# Patient Record
Sex: FEMALE | Race: WHITE | NOT HISPANIC OR LATINO | Employment: STUDENT | URBAN - METROPOLITAN AREA
[De-identification: names, ages, dates, MRNs, and addresses within clinical notes are randomized per-mention and may not be internally consistent; named-entity substitution may affect disease eponyms.]

---
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
## 2024-01-04 NOTE — COMMUNITY RESOURCES LIST (ENGLISH)
01/04/2024   [facility]  N/A  For questions about this resource list or additional care needs, please contact your primary care clinic or care manager.  Phone: [phone]   Email: N/A   Address: [facility] [place] [address]   Hours: N/A        Hotlines and Helplines       Hotline - Housing crisis  1  [facility] Housing Resource Line Distance: 7.42 miles      Phone/Virtual   [address] [place] [address]  Language: English  Hours: Mon - Sun Open 24 Hours   Phone: (600) 881-5868 Website: https://www.[email or web address]/Basic-Needs     2  [facility] Distance: 14.33 miles      Phone/Virtual   [address] [place] [address]  Language: English  Hours: Mon - Sun Open 24 Hours   Phone: (402) 819-9494 Email: communications@[facility].org Website: https://[facility].org/oursaviourshousing/          Housing       Coordinated Entry access point  3  [facility]  Office - [facility] Distance: 2.37 miles      Phone/Virtual   [address] [place] [address]  Language: English  Hours: Mon - Fri 8:30 AM - 12:00 PM , Mon - Fri 1:00 PM - 4:00 PM  Fees: Free   Phone: (436) 269-2165 Ext.2 Email: ashly@[facility].org Website: https://www.[facility].org/usn/     4  [facility] ([facility] - Housing Services Distance: 14.46 miles      In-Person   [address] [place] [address]  Language: English  Hours: Mon - Fri 9:00 AM - 5:00 PM  Fees: Free   Phone: (199) 672-4123 Email: housing@[facility].org Website: http://www.[facility].org/housing     Drop-in center or day shelter  5  [employer] Distance: 7.56 miles      In-Person   [address] [place] [address]  Language: English  Hours: Mon 11:00 AM - 7:00 PM , Tue 11:00 PM - 7:00 PM , Wed 1:00 AM - 7:00 PM , Thu 11:00 AM - 7:00 PM , Fri 11:00 AM - 5:00 PM  Fees: Free   Phone: (191) 756-7764 Email: info@[facility].org Website: http://[address].org     6   [facility] [employer] [facility] Distance: 12.86 miles      In-Person   [address] [place] [address]  Language: English, Hmong, Micronesian, Malay  Hours: Mon - Thu 8:30 AM - 4:30 PM , Sat - Sun 9:00 AM - 12:00 PM  Fees: Free   Phone: (304) 328-6395 Email: info@[employer].org Website: https://[employer].org/     Housing search assistance  7  [facility] [employer] [facility] ([facility]) - [facility] Rental Housing Distance: 2.36 miles      In-Person, Phone/Virtual   [address] [address]  Language: English  Hours: Mon - Fri 8:00 AM - 4:30 PM  Fees: Free   Phone: (728) 578-9278 Email: accap@[employer].org Website: http://www.[employer]ap.org     8  Neighborhood Assistance [employer] ([place]) Distance: 7.54 miles      Phone/Virtual   [address] [place] [address]  Language: English, Malay  Hours: Mon - Fri 9:00 AM - 5:00 PM  Fees: Free   Phone: (862) 101-2726 Email: services@[employer] Website: https://www.real trends     Shelter for families  9  [facility] Distance: 12.07 miles      In-Person   [address] [place] [address]  Language: English  Hours: Mon - Fri 3:00 PM - 9:00 AM , Sat - Sun Open 24 Hours  Fees: Free   Phone: (262) 316-2868 Ext.1 Website: https://www.[facility].org/2020/07/03/emergency-family-shelter/     Shelter for individuals  10  [facility] Distance: 13.19 miles      In-Person   [address] [place] [address]  Language: English  Hours: Mon - Fri 4:00 PM - 9:00 AM  Fees: Free   Phone: (969) 329-8357 Email: albert@[facility].org Website: https://[facility].[employer].org/[facility]/     11  [facility] Distance: 14.33 miles      In-Person   [address] [place] [address]  Language: English  Hours: Mon - Sun Open 24 Hours  Fees: Free   Phone: (857) 762-5547 Email: communications@[facility].org Website:  https://[facility].org/oursaviourshousing/     Shelter for youth  12  [facility] - [facility] Distance: 8.25 miles      In-Person   [address] [place] [address]  Language: English  Hours: Mon - Sun Open 24 Hours  Fees: Free   Phone: (116) 364-7839 Ext.2 Email: info@[facility].org Website: http://[facility].org/shelter-transitional-living-programs/     13  [facility] - Emergency Youth Shelter Distance: 13.63 miles      In-Person   [address] [place] [address]  Language: English  Hours: Mon - Sun Open 24 Hours  Fees: Free   Phone: (855) 190-8319 Email: kaiden@[facility].org Website: https://[facility].org/[facility]/          Important Numbers & Websites       Emergency Services   [address] [address]  Poison Control   (283) 163-1084  Suicide Prevention Lifeline   (429) 722-4638 (TALK)  Child Abuse Hotline   (814) 660-3438 (4-A-Child)  Sexual Assault Hotline   (952) 943-4226 (HOPE)  National Runaway Safeline   (578) 877-3369 (RUNAWAY)  All-Options Talkline   (764) 580-7108  Substance Abuse Referral   (287) 211-4003 (HELP)

## 2024-01-10 ENCOUNTER — VIRTUAL VISIT (OUTPATIENT)
Dept: FAMILY MEDICINE | Facility: CLINIC | Age: 19
End: 2024-01-10
Payer: COMMERCIAL

## 2024-01-10 DIAGNOSIS — F42.9 OBSESSIVE-COMPULSIVE DISORDER, UNSPECIFIED TYPE: Primary | ICD-10-CM

## 2024-01-10 PROCEDURE — 99213 OFFICE O/P EST LOW 20 MIN: CPT | Mod: 95 | Performed by: FAMILY MEDICINE

## 2024-01-10 RX ORDER — SERTRALINE HYDROCHLORIDE 25 MG/1
25 TABLET, FILM COATED ORAL DAILY
Qty: 90 TABLET | Refills: 1 | Status: CANCELLED | OUTPATIENT
Start: 2024-01-10

## 2024-01-10 RX ORDER — SERTRALINE HYDROCHLORIDE 100 MG/1
100 TABLET, FILM COATED ORAL DAILY
Qty: 90 TABLET | Refills: 1 | Status: SHIPPED | OUTPATIENT
Start: 2024-01-10 | End: 2024-06-11

## 2024-01-10 ASSESSMENT — ANXIETY QUESTIONNAIRES
GAD7 TOTAL SCORE: 5
5. BEING SO RESTLESS THAT IT IS HARD TO SIT STILL: NOT AT ALL
2. NOT BEING ABLE TO STOP OR CONTROL WORRYING: SEVERAL DAYS
1. FEELING NERVOUS, ANXIOUS, OR ON EDGE: MORE THAN HALF THE DAYS
GAD7 TOTAL SCORE: 5
6. BECOMING EASILY ANNOYED OR IRRITABLE: NOT AT ALL
IF YOU CHECKED OFF ANY PROBLEMS ON THIS QUESTIONNAIRE, HOW DIFFICULT HAVE THESE PROBLEMS MADE IT FOR YOU TO DO YOUR WORK, TAKE CARE OF THINGS AT HOME, OR GET ALONG WITH OTHER PEOPLE: SOMEWHAT DIFFICULT
3. WORRYING TOO MUCH ABOUT DIFFERENT THINGS: SEVERAL DAYS
7. FEELING AFRAID AS IF SOMETHING AWFUL MIGHT HAPPEN: SEVERAL DAYS

## 2024-01-10 ASSESSMENT — PATIENT HEALTH QUESTIONNAIRE - PHQ9
SUM OF ALL RESPONSES TO PHQ QUESTIONS 1-9: 4
5. POOR APPETITE OR OVEREATING: NOT AT ALL

## 2024-01-10 NOTE — PROGRESS NOTES
Chio is a 18 year old who is being evaluated via a billable video visit.      How would you like to obtain your AVS? MyChart  If the video visit is dropped, the invitation should be resent by: Text to cell phone: [phone]  Will anyone else be joining your video visit? No          Assessment & Plan     Chio was seen today for recheck.    Diagnoses and all orders for this visit:    Obsessive-compulsive disorder, unspecified type, slight worsening  - PHQ-9/GERONIMO 7 completed, see below/Epic for details    - Increase dose: sertraline (ZOLOFT) 100 MG tablet; Take 1 tablet (100 mg) by mouth daily  - Continue Psychotherapy Counseling.     Return in about 1 month (around 2/10/2024) for Medication check, Virtual Visit..      Audelia Squires MD  [facility] DAIN Barroso is a 18 year old, presenting for the following health issues:  RECHECK        1/10/2024    10:31 AM   Additional Questions   Roomed by MP         1/10/2024    10:31 AM   Patient Reported Additional Medications   Patient reports taking the following new medications None per patient       Medication Followup of Zoloft-OCD  Taking Medication as prescribed: yes  Side Effects:  None  Medication Helping Symptoms:  yes    Completed 1st Semester of College.    States that her mental health has generally been stable but the OCD tendencies have intensified.over the past few months   Feeling the need to touch surfaces, multiple times.  Feels like these tendencies are getting stronger and getting in the way and starting to bother her.   Still taking her Sertraline 75 mg/day as prescribed and seeing a Therapist. Tolerating meds well. No side effects reported.  Recently seen for headaches on 12/28- thought to have Episodic tension headaches, given Naproxen which is helping.   Denies any current stressors. No neurological focal deficits. Patient is aware to schedule a follow up visit if the headaches are worsening/failing to improve.          1/10/2024     4:43 PM   Last PHQ-9   1.  Little interest or pleasure in doing things 1   2.  Feeling down, depressed, or hopeless 1   3.  Trouble falling or staying asleep, or sleeping too much 0   4.  Feeling tired or having little energy 1   5.  Poor appetite or overeating 1   6.  Feeling bad about yourself 0   7.  Trouble concentrating 0   8.  Moving slowly or restless 0   Q9: Thoughts of better off dead/self-harm past 2 weeks 0   PHQ-9 Total Score 4   Difficulty at work, home, or with people Somewhat difficult         1/10/2024     4:43 PM   GERONIMO-7    1. Feeling nervous, anxious, or on edge 2   2. Not being able to stop or control worrying 1   3. Worrying too much about different things 1   4. Trouble relaxing 0   5. Being so restless that it is hard to sit still 0   6. Becoming easily annoyed or irritable 0   7. Feeling afraid, as if something awful might happen 1   GERONIMO-7 Total Score 5   If you checked any problems, how difficult have they made it for you to do your work, take care of things at home, or get along with other people? Somewhat difficult     In the past two weeks have you had thoughts of suicide or self-harm?  No.    Do you have concerns about your personal safety or the safety of others?   No        Review of Systems   Constitutional, HEENT, cardiovascular, pulmonary, gi and gu systems are negative, except as otherwise noted.      Objective           Vitals:  No vitals were obtained today due to virtual visit.    Physical Exam   GENERAL: Healthy, alert and no distress  EYES: Eyes grossly normal to inspection.  No discharge or erythema, or obvious scleral/conjunctival abnormalities.  RESP: No audible wheeze, cough, or visible cyanosis.  No visible retractions or increased work of breathing.    SKIN: Visible skin clear. No significant rash, abnormal pigmentation or lesions.  NEURO: Cranial nerves grossly intact.  Mentation and speech appropriate for age.  PSYCH: mentation appears normal, anxious, and  judgement and insight intact        Video-Visit Details    Type of service:  Video Visit     Originating Location (pt. Location): Home  Distant Location (provider location):  On-site  Platform used for Video Visit: [employer]

## 2024-01-11 ENCOUNTER — TELEPHONE (OUTPATIENT)
Dept: FAMILY MEDICINE | Facility: CLINIC | Age: 19
End: 2024-01-11
Payer: COMMERCIAL

## 2024-01-11 DIAGNOSIS — F42.9 OBSESSIVE-COMPULSIVE DISORDER, UNSPECIFIED TYPE: ICD-10-CM

## 2024-01-11 RX ORDER — SERTRALINE HYDROCHLORIDE 100 MG/1
100 TABLET, FILM COATED ORAL DAILY
Qty: 90 TABLET | Refills: 1 | OUTPATIENT
Start: 2024-01-11

## 2024-01-11 NOTE — TELEPHONE ENCOUNTER
Called patient, and she would like her meds sent to [facility] and not [facility].    Advised patient that she does have refills there already.    Patient stated understanding and agreeable with the plan of care.     Madina BOONE RN  Triage Nurse  [facility]

## 2024-01-11 NOTE — TELEPHONE ENCOUNTER
Patient has refills at local [facility] pharmacy.     Please call for clarification.     Faby Rivera RN BSN  [facility]

## 2024-01-24 DIAGNOSIS — G44.219 EPISODIC TENSION-TYPE HEADACHE, NOT INTRACTABLE: ICD-10-CM

## 2024-01-25 RX ORDER — NAPROXEN 500 MG/1
500 TABLET ORAL 2 TIMES DAILY WITH MEALS
Qty: 60 TABLET | Refills: 0 | Status: SHIPPED | OUTPATIENT
Start: 2024-01-25 | End: 2024-02-23

## 2024-02-23 DIAGNOSIS — G44.219 EPISODIC TENSION-TYPE HEADACHE, NOT INTRACTABLE: ICD-10-CM

## 2024-02-23 RX ORDER — NAPROXEN 500 MG/1
500 TABLET ORAL 2 TIMES DAILY WITH MEALS
Qty: 60 TABLET | Refills: 0 | Status: SHIPPED | OUTPATIENT
Start: 2024-02-23

## 2024-03-13 ENCOUNTER — OFFICE VISIT (OUTPATIENT)
Dept: FAMILY MEDICINE | Facility: CLINIC | Age: 19
End: 2024-03-13
Payer: COMMERCIAL

## 2024-03-13 VITALS
RESPIRATION RATE: 16 BRPM | SYSTOLIC BLOOD PRESSURE: 100 MMHG | BODY MASS INDEX: 19.66 KG/M2 | DIASTOLIC BLOOD PRESSURE: 56 MMHG | OXYGEN SATURATION: 100 % | HEART RATE: 78 BPM | HEIGHT: 65 IN | WEIGHT: 118 LBS | TEMPERATURE: 97.8 F

## 2024-03-13 DIAGNOSIS — Z11.59 NEED FOR HEPATITIS C SCREENING TEST: ICD-10-CM

## 2024-03-13 DIAGNOSIS — Z00.00 ROUTINE GENERAL MEDICAL EXAMINATION AT A HEALTH CARE FACILITY: Primary | ICD-10-CM

## 2024-03-13 DIAGNOSIS — F42.9 OBSESSIVE-COMPULSIVE DISORDER, UNSPECIFIED TYPE: ICD-10-CM

## 2024-03-13 DIAGNOSIS — Z11.4 SCREENING FOR HIV (HUMAN IMMUNODEFICIENCY VIRUS): ICD-10-CM

## 2024-03-13 DIAGNOSIS — R74.8 ELEVATED LIVER ENZYMES: ICD-10-CM

## 2024-03-13 PROCEDURE — 86803 HEPATITIS C AB TEST: CPT | Performed by: FAMILY MEDICINE

## 2024-03-13 PROCEDURE — 84450 TRANSFERASE (AST) (SGOT): CPT | Performed by: FAMILY MEDICINE

## 2024-03-13 PROCEDURE — 36415 COLL VENOUS BLD VENIPUNCTURE: CPT | Performed by: FAMILY MEDICINE

## 2024-03-13 PROCEDURE — 99395 PREV VISIT EST AGE 18-39: CPT | Performed by: FAMILY MEDICINE

## 2024-03-13 PROCEDURE — 84460 ALANINE AMINO (ALT) (SGPT): CPT | Performed by: FAMILY MEDICINE

## 2024-03-13 PROCEDURE — 99213 OFFICE O/P EST LOW 20 MIN: CPT | Mod: 25 | Performed by: FAMILY MEDICINE

## 2024-03-13 PROCEDURE — 87389 HIV-1 AG W/HIV-1&-2 AB AG IA: CPT | Performed by: FAMILY MEDICINE

## 2024-03-13 SDOH — HEALTH STABILITY: PHYSICAL HEALTH: ON AVERAGE, HOW MANY MINUTES DO YOU ENGAGE IN EXERCISE AT THIS LEVEL?: 40 MIN

## 2024-03-13 SDOH — HEALTH STABILITY: PHYSICAL HEALTH: ON AVERAGE, HOW MANY DAYS PER WEEK DO YOU ENGAGE IN MODERATE TO STRENUOUS EXERCISE (LIKE A BRISK WALK)?: 3 DAYS

## 2024-03-13 ASSESSMENT — SOCIAL DETERMINANTS OF HEALTH (SDOH): HOW OFTEN DO YOU GET TOGETHER WITH FRIENDS OR RELATIVES?: TWICE A WEEK

## 2024-03-13 ASSESSMENT — ANXIETY QUESTIONNAIRES
1. FEELING NERVOUS, ANXIOUS, OR ON EDGE: MORE THAN HALF THE DAYS
5. BEING SO RESTLESS THAT IT IS HARD TO SIT STILL: NOT AT ALL
GAD7 TOTAL SCORE: 6
3. WORRYING TOO MUCH ABOUT DIFFERENT THINGS: SEVERAL DAYS
IF YOU CHECKED OFF ANY PROBLEMS ON THIS QUESTIONNAIRE, HOW DIFFICULT HAVE THESE PROBLEMS MADE IT FOR YOU TO DO YOUR WORK, TAKE CARE OF THINGS AT HOME, OR GET ALONG WITH OTHER PEOPLE: SOMEWHAT DIFFICULT
2. NOT BEING ABLE TO STOP OR CONTROL WORRYING: SEVERAL DAYS
6. BECOMING EASILY ANNOYED OR IRRITABLE: NOT AT ALL
7. FEELING AFRAID AS IF SOMETHING AWFUL MIGHT HAPPEN: SEVERAL DAYS
GAD7 TOTAL SCORE: 6

## 2024-03-13 ASSESSMENT — PATIENT HEALTH QUESTIONNAIRE - PHQ9
SUM OF ALL RESPONSES TO PHQ QUESTIONS 1-9: 5
5. POOR APPETITE OR OVEREATING: SEVERAL DAYS

## 2024-03-13 NOTE — PROGRESS NOTES
Preventive Care Visit  [facility] DAIN Squires MD, Family Medicine  Mar 13, 2024      Assessment & Plan     Routine general medical examination at a health care facility  - REVIEW OF HEALTH MAINTENANCE PROTOCOL ORDERS    Screening for HIV (human immunodeficiency virus)  - HIV Antigen Antibody Combo    Need for hepatitis C screening test  - Hepatitis C Screen Reflex to HCV RNA Quant and Genotype    Elevated liver enzymes  - ALT  - AST    Obsessive-compulsive disorder, unspecified type  -On an SSRI  - Adult Mental Health  Referral-medication management      Patient has been advised of split billing requirements and indicates understanding: Yes        Counseling  Appropriate preventive services were discussed with this patient, including applicable screening as appropriate for fall prevention, nutrition, physical activity, Tobacco-use cessation, weight loss and cognition.  Checklist reviewing preventive services available has been given to the patient.  Reviewed patient's diet, addressing concerns and/or questions.   She is at risk for lack of exercise and has been provided with information to increase physical activity for the benefit of her well-being.   The patient's PHQ-9 score is consistent with mild depression. She was provided with information regarding depression.       Return in about 6 months (around 9/13/2024) for Medication check, Virtual Visit..      Christ Barroso is a 18 year old, presenting for the following:  Physical        3/13/2024     2:46 PM   Additional Questions   Roomed by MP         3/13/2024     2:46 PM   Patient Reported Additional Medications   Patient reports taking the following new medications None per patient            Healthy Habits:     Getting at least 3 servings of Calcium per day:  Yes    Bi-annual eye exam:  Yes    Dental care twice a year:  Yes    Sleep apnea or symptoms of sleep apnea:  None    Diet:  Regular (no restrictions)     Frequency of exercise:  2-3 days/week    Duration of exercise:  45-60 minutes    Taking medications regularly:  Yes    Barriers to taking medications:  None    Medication side effects:  None    Additional concerns today:  No    Last OV- 1/10/2024-  Reported worsening OCD symptoms.   Sertraline dose was increased to 100 mg/day. Tolerating well. No side effects reported. Sees a Therapist at school as needed.   Learning to cope with OCD symptoms, still not very well controlled.   Patient is open to psychiatry consult for medication management.          3/13/2024     3:12 PM   Last PHQ-9   1.  Little interest or pleasure in doing things 1   2.  Feeling down, depressed, or hopeless 1   3.  Trouble falling or staying asleep, or sleeping too much 0   4.  Feeling tired or having little energy 1   5.  Poor appetite or overeating 0   6.  Feeling bad about yourself 1   7.  Trouble concentrating 1   8.  Moving slowly or restless 0   Q9: Thoughts of better off dead/self-harm past 2 weeks 0   PHQ-9 Total Score 5   Difficulty at work, home, or with people Not difficult at all         3/13/2024     3:12 PM   GERONIMO-7    1. Feeling nervous, anxious, or on edge 2   2. Not being able to stop or control worrying 1   3. Worrying too much about different things 1   4. Trouble relaxing 1   5. Being so restless that it is hard to sit still 0   6. Becoming easily annoyed or irritable 0   7. Feeling afraid, as if something awful might happen 1   GERONIMO-7 Total Score 6   If you checked any problems, how difficult have they made it for you to do your work, take care of things at home, or get along with other people? Somewhat difficult     In the past two weeks have you had thoughts of suicide or self-harm?  No.    Do you have concerns about your personal safety or the safety of others?   No    Previous labs reviewed elevated liver function tests:-    Component      Latest Ref Rn 12/28/2023  4:37 PM   AST      0 - 35 U/L 51 (H)    ALT      0 - 50  U/L 65 (H)      States that she was taking a lot of Tylenol at that time for her headaches. Headaches have since resolved.   Due for repeat labs.     HEALTH CARE MAINTENANCE: Due for labs.  Declines vaccine          3/13/2024   General Health   How would you rate your overall physical health? Good   Feel stress (tense, anxious, or unable to sleep) To some extent   (!) STRESS CONCERN      3/13/2024   Nutrition   Three or more servings of calcium each day? Yes   Diet: Regular (no restrictions)   How many servings of fruit and vegetables per day? (!) 2-3   How many sweetened beverages each day? 0-1         3/13/2024   Exercise   Days per week of moderate/strenous exercise 3 days   Average minutes spent exercising at this level 40 min         3/13/2024   Social Factors   Frequency of gathering with friends or relatives Twice a week   Worry food won't last until get money to buy more No   Food not last or not have enough money for food? No   Do you have housing?  Yes   Are you worried about losing your housing? No   Lack of transportation? No   Unable to get utilities (heat,electricity)? No         3/13/2024   Dental   Dentist two times every year? Yes         3/13/2024   TB Screening   Were you born outside of [place]?  No       Today's PHQ-2 Score:       1/10/2024    10:34 AM   PHQ-2 ( 1999 [employer])   Q1: Little interest or pleasure in doing things 1   Q2: Feeling down, depressed or hopeless 1   PHQ-2 Score 2         3/13/2024   Substance Use   Alcohol more than 3/day or more than 7/wk Not Applicable   Do you use any other substances recreationally? No     Social History     Tobacco Use    Smoking status: Never     Passive exposure: Never    Smokeless tobacco: Never    Tobacco comments:     Lives in smoke free household   Vaping Use    Vaping Use: Never used   Substance Use Topics    Alcohol use: No     Alcohol/week: 0.0 standard drinks of alcohol    Drug use: No           3/13/2024   One time HIV Screening   Previous HIV  "test? No         3/13/2024   STI Screening   New sexual partner(s) since last STI/HIV test? No     History of abnormal Pap smear: NO - under age 21, PAP not appropriate for age             3/13/2024   Contraception/Family Planning   Questions about contraception or family planning No       Reviewed and updated as needed this visit by Provider                    Past Medical History:   Diagnosis Date    Amblyopia of right eye 01/24/2013     Past Surgical History:   Procedure Laterality Date    NO HISTORY OF SURGERY           Review of Systems  Constitutional, neuro, ENT, endocrine, pulmonary, cardiac, gastrointestinal, genitourinary, musculoskeletal, integument and psychiatric systems are negative, except as otherwise noted.     Objective    Exam  /56   Pulse 78   Temp 97.8  F (36.6  C) (Temporal)   Resp 16   Ht 1.651 m (5' 5\")   Wt 53.5 kg (118 lb)   LMP 03/06/2024 (Approximate)   SpO2 100%   BMI 19.64 kg/m     Estimated body mass index is 19.64 kg/m  as calculated from the following:    Height as of this encounter: 1.651 m (5' 5\").    Weight as of this encounter: 53.5 kg (118 lb).    Physical Exam  GENERAL: alert and no distress  EYES: Eyes grossly normal to inspection, PERRL and conjunctivae and sclerae normal  HENT: ear canals and TM's normal, nose and mouth without ulcers or lesions  NECK: no adenopathy, no asymmetry, masses, or scars  RESP: lungs clear to auscultation - no rales, rhonchi or wheezes  CV: regular rate and rhythm, normal S1 S2, no S3 or S4, no murmur, click or rub, no peripheral edema  ABDOMEN: soft, nontender, no hepatosplenomegaly, no masses and bowel sounds normal  MS: no gross musculoskeletal defects noted, no edema  SKIN: no suspicious lesions or rashes  NEURO: Normal strength and tone, mentation intact and speech normal  PSYCH: mentation appears normal, affect normal/bright  : Deferred.       Signed Electronically by: Audelia Squires MD    "

## 2024-03-14 LAB
ALT SERPL W P-5'-P-CCNC: 18 U/L (ref 0–50)
AST SERPL W P-5'-P-CCNC: 20 U/L (ref 0–35)
HIV 1+2 AB+HIV1 P24 AG SERPL QL IA: NONREACTIVE

## 2024-03-15 LAB — HCV AB SERPL QL IA: NONREACTIVE

## 2024-06-03 DIAGNOSIS — F42.9 OBSESSIVE-COMPULSIVE DISORDER, UNSPECIFIED TYPE: ICD-10-CM

## 2024-06-03 RX ORDER — SERTRALINE HYDROCHLORIDE 100 MG/1
100 TABLET, FILM COATED ORAL DAILY
Qty: 90 TABLET | Refills: 1 | OUTPATIENT
Start: 2024-06-03

## 2024-06-11 ENCOUNTER — VIRTUAL VISIT (OUTPATIENT)
Dept: PSYCHIATRY | Facility: CLINIC | Age: 19
End: 2024-06-11
Payer: COMMERCIAL

## 2024-06-11 ENCOUNTER — VIRTUAL VISIT (OUTPATIENT)
Dept: BEHAVIORAL HEALTH | Facility: CLINIC | Age: 19
End: 2024-06-11
Payer: COMMERCIAL

## 2024-06-11 DIAGNOSIS — F42.9 OBSESSIVE-COMPULSIVE DISORDER, UNSPECIFIED TYPE: Primary | ICD-10-CM

## 2024-06-11 DIAGNOSIS — F42.9 OBSESSIVE-COMPULSIVE DISORDER, UNSPECIFIED TYPE: ICD-10-CM

## 2024-06-11 PROCEDURE — 90791 PSYCH DIAGNOSTIC EVALUATION: CPT | Mod: 95 | Performed by: SOCIAL WORKER

## 2024-06-11 PROCEDURE — 99417 PROLNG OP E/M EACH 15 MIN: CPT | Mod: 95

## 2024-06-11 PROCEDURE — 99205 OFFICE O/P NEW HI 60 MIN: CPT | Mod: 95

## 2024-06-11 RX ORDER — SERTRALINE HYDROCHLORIDE 100 MG/1
150 TABLET, FILM COATED ORAL DAILY
Qty: 45 TABLET | Refills: 1 | Status: SHIPPED | OUTPATIENT
Start: 2024-06-11 | End: 2024-07-30

## 2024-06-11 ASSESSMENT — PATIENT HEALTH QUESTIONNAIRE - PHQ9
SUM OF ALL RESPONSES TO PHQ QUESTIONS 1-9: 7
10. IF YOU CHECKED OFF ANY PROBLEMS, HOW DIFFICULT HAVE THESE PROBLEMS MADE IT FOR YOU TO DO YOUR WORK, TAKE CARE OF THINGS AT HOME, OR GET ALONG WITH OTHER PEOPLE: SOMEWHAT DIFFICULT
SUM OF ALL RESPONSES TO PHQ QUESTIONS 1-9: 7
10. IF YOU CHECKED OFF ANY PROBLEMS, HOW DIFFICULT HAVE THESE PROBLEMS MADE IT FOR YOU TO DO YOUR WORK, TAKE CARE OF THINGS AT HOME, OR GET ALONG WITH OTHER PEOPLE: SOMEWHAT DIFFICULT
SUM OF ALL RESPONSES TO PHQ QUESTIONS 1-9: 7
SUM OF ALL RESPONSES TO PHQ QUESTIONS 1-9: 7

## 2024-06-11 ASSESSMENT — COLUMBIA-SUICIDE SEVERITY RATING SCALE - C-SSRS
TOTAL  NUMBER OF ABORTED OR SELF INTERRUPTED ATTEMPTS LIFETIME: NO
ATTEMPT LIFETIME: NO
1. HAVE YOU WISHED YOU WERE DEAD OR WISHED YOU COULD GO TO SLEEP AND NOT WAKE UP?: NO
TOTAL  NUMBER OF INTERRUPTED ATTEMPTS LIFETIME: NO
2. HAVE YOU ACTUALLY HAD ANY THOUGHTS OF KILLING YOURSELF?: NO
6. HAVE YOU EVER DONE ANYTHING, STARTED TO DO ANYTHING, OR PREPARED TO DO ANYTHING TO END YOUR LIFE?: NO

## 2024-06-11 ASSESSMENT — ANXIETY QUESTIONNAIRES
2. NOT BEING ABLE TO STOP OR CONTROL WORRYING: SEVERAL DAYS
8. IF YOU CHECKED OFF ANY PROBLEMS, HOW DIFFICULT HAVE THESE MADE IT FOR YOU TO DO YOUR WORK, TAKE CARE OF THINGS AT HOME, OR GET ALONG WITH OTHER PEOPLE?: SOMEWHAT DIFFICULT
2. NOT BEING ABLE TO STOP OR CONTROL WORRYING: SEVERAL DAYS
1. FEELING NERVOUS, ANXIOUS, OR ON EDGE: MORE THAN HALF THE DAYS
5. BEING SO RESTLESS THAT IT IS HARD TO SIT STILL: NOT AT ALL
6. BECOMING EASILY ANNOYED OR IRRITABLE: SEVERAL DAYS
6. BECOMING EASILY ANNOYED OR IRRITABLE: SEVERAL DAYS
GAD7 TOTAL SCORE: 7
7. FEELING AFRAID AS IF SOMETHING AWFUL MIGHT HAPPEN: SEVERAL DAYS
GAD7 TOTAL SCORE: 7
4. TROUBLE RELAXING: SEVERAL DAYS
IF YOU CHECKED OFF ANY PROBLEMS ON THIS QUESTIONNAIRE, HOW DIFFICULT HAVE THESE PROBLEMS MADE IT FOR YOU TO DO YOUR WORK, TAKE CARE OF THINGS AT HOME, OR GET ALONG WITH OTHER PEOPLE: SOMEWHAT DIFFICULT
1. FEELING NERVOUS, ANXIOUS, OR ON EDGE: MORE THAN HALF THE DAYS
GAD7 TOTAL SCORE: 7
5. BEING SO RESTLESS THAT IT IS HARD TO SIT STILL: NOT AT ALL
7. FEELING AFRAID AS IF SOMETHING AWFUL MIGHT HAPPEN: SEVERAL DAYS
3. WORRYING TOO MUCH ABOUT DIFFERENT THINGS: SEVERAL DAYS
7. FEELING AFRAID AS IF SOMETHING AWFUL MIGHT HAPPEN: SEVERAL DAYS
4. TROUBLE RELAXING: SEVERAL DAYS
3. WORRYING TOO MUCH ABOUT DIFFERENT THINGS: SEVERAL DAYS
GAD7 TOTAL SCORE: 7
7. FEELING AFRAID AS IF SOMETHING AWFUL MIGHT HAPPEN: SEVERAL DAYS
IF YOU CHECKED OFF ANY PROBLEMS ON THIS QUESTIONNAIRE, HOW DIFFICULT HAVE THESE PROBLEMS MADE IT FOR YOU TO DO YOUR WORK, TAKE CARE OF THINGS AT HOME, OR GET ALONG WITH OTHER PEOPLE: SOMEWHAT DIFFICULT
8. IF YOU CHECKED OFF ANY PROBLEMS, HOW DIFFICULT HAVE THESE MADE IT FOR YOU TO DO YOUR WORK, TAKE CARE OF THINGS AT HOME, OR GET ALONG WITH OTHER PEOPLE?: SOMEWHAT DIFFICULT

## 2024-06-11 NOTE — PROGRESS NOTES
"  [facility] Formerly [facility]iatry [facility] ([facility])  NEW PATIENT DIAGNOSTIC ASSESSMENT     Chio Kingston is a 18 year old who prefers the name Chio and pronouns she/her.     Initial consultation on 06/11/24.   Who referred you to care?: primary care provider  CARE TEAM:   PCP- Audelia Squires  Therapist- None              Chief Complaint   Chio identified the reason for seeking services at this time as: \"OCD and current medications\". Reported this as beginning approximately 09/01/21.    Per 03/13/24 referral to [facility] by PCP: \"OCD management\"               Assessment & Plan     History and interview support the following diagnoses:   Obsessive Compulsive Disorder    Chio is a 18-year-old adult with history of OCD who presents for psychiatric evaluation with [facility]. Medical co-morbidities include chronic headaches.    Overall Chio is doing well. She has been able to interrupt her nighttime routine and has not engaged in this routing for over a year. We completed a YBOCS today with score of 17. She feels that sertraline has been effective for OCD symptom control at 100mg daily however continues to report lingering ruminations and continued engagement in certain compulsions including checking. She has no history of safety concerns and denies SI, NSSI, or HI today. Clinical picture does not involve substance use.    We reviewed use of SSRIs for management of OCD and discussed how dosing tends to be different (typically higher) to fully target OCD symptoms. Patient in agreement with plan to titrate sertraline to 150mg daily with plan to hold dose increase for at least 8-12 weeks to assess full therapeutic efficacy of increase.     We discussed the risks and benefits of current medication regimen, including precautions, drug interactions and/or potential side effects/adverse reactions. Reviewed adverse side effects of using " medications that affect serotonin levels in the body including potential for these medications to contribute to thoughts of death or suicidal thoughts--if that were to happen we would stop the medication right away. We discussed additional side effects including but not limited to signs/symptoms of serotonin syndrome and need to seek urgent medical care should concerns for this arise, risk of time-limited GI upset, and potential for sexual side effects. The patient verbalized understanding of the risks and consented to treatment with the capacity to do so. The patient knows to call the clinic for any problems or access emergency care if needed.    Patient is not engaged in therapy at this time. She may benefit from ERP and will continue to discuss this at follow-up visits.      PSYCHOTROPIC DRUG INTERACTIONS: **Italicized interactions are for treatment plan options not currently implemented**  ADDITIVE SEDATION: sertraline, Imitrex (rare use)  ADDITIVE BLEEDING RISK: sertraline, naproxen (rare use)    MANAGEMENT:  use lowest therapeutic doses of psychotropic medications, routine monitoring, and patient aware of risk    [place] was [place] today: not using controlled substances              Plan    1) PSYCHOTROPIC MEDICATION RECOMMENDATIONS:  - Increase sertraline to 150mg daily to target OCD symptoms    2) THERAPY: Psychotherapy is a primary recommendation. Declines referral at this time. May benefit from CBT/ERP.    3) NEXT DUE:   Labs- Routine monitoring is not indicated for current psychotropic medication regimen   ECG- Routine monitoring is not indicated for current psychotropic medication regimen   Rating Scales-   YBOCS:  06/11/24: 17    4) REFERRALS / COORDINATION: None    5) DISPOSITION:   - Pt would benefit from brief psychiatric intervention (up to ~5 visits) to adjust meds and gauge for benefit.   - Follow up with CLAYTON Reyes CNP in 4-6 weeks. Plan to transition med management back to designated  prescriber after brief care is complete.   - If not seen for 6 months, follow up and prescribing will automatically revert back to referring provider / PCP.     Treatment Risk Statement:  The patient understands the risks, benefits, adverse effects and alternatives. Agrees to treatment with the capacity to do so. No medical contraindications to treatment. Agrees to contact provider for any problems. The patient understands to call 911 or go to the nearest ED if urgent or life threatening symptoms occur. Crisis contact numbers are provided routinely in the After Visit Summary.    Suicide Risk Assessment:  Today Chio Kingston reports no suicidal or homicidal ideation. In addition, there are notable risk factors for self-harm, including age and anxiety. However, risk is mitigated by commitment to family, sobriety, absence of past attempts, ability to volunteer a safety plan, history of seeking help when needed, future oriented, no access to firearms or weapons, denies suicidal intent or plan, no family history of suicide, and denies homicidal ideation, intent, or plan. Therefore, based on all available evidence including the factors cited above, Chio Kingston does not appear to be at imminent risk for self-harm, does not meet criteria for a 72-hr hold, and therefore remains appropriate for ongoing outpatient level of care.  A thorough assessment of risk factors related to suicide and self-harm have been reviewed and are noted above. The patient convincingly denies suicidality on several occasions. Local community safety resources printed and reviewed for patient to use if needed. There was no deceit detected, and the patient presented in a manner that was believable.     Recommended that patient call 911 or go to the local ED should there be a change in any of these risk factors.    PROVIDER:  CLAYTON Reyes CNP    The [facility] psychiatry providers act as a specialty service for Primary Care Providers in the   [facility] who seek to optimize medications for unstable patients. Once medications have been optimized, [facility] providers discharge the patient back to the referring Primary Care Provider for ongoing medication management. Care team has reviewed attendance agreement with patient. Patient advised that two failed appointments within 6 months may lead to termination of current episode of care.     Patient Status:  Patient will continue to be seen for ongoing consultation and stabilization.              Pertinent Background  Diagnosed with OCD September 2021 when psychotherapy was initiated. Symptoms of OCD have been present the majority of her life.   Psych pertinent item history includes trauma hx              History of Present Illness     Patient attends today's visit alone and she is a good historian. EHR reviewed for collateral. We reviewed the following:     Taking sertraline for about 2 years (has been at 100mg for about 4 months) which has been effective (less rumination, was able to interrupt nighttime routine).     OCD  -Obsessions: Fear of safety for herself/others (generally fear of harm for loved ones)    Compulsions:   -hx of nighttime routine involving need for specific thoughts, touching things in a specific way.(has not engaged in routine for 1.5 years). Would take around 30 minutes.  -Denies current routines/rituals; better able to interrupt routines with sertraline  - Compulsions tend to occur checking, touching    Mood  -Denies symptoms of depression or low mood.  -Energy is stable   -Denies SI, NSSI, HI. No history of safety concerns.    Sleep  -Denies concerns. Getting about 7-8 hours per night.  -No nightmares. About 1-2 years ago had been experiencing more frequent nightmares.     Current Psychotropic Medication  Zoloft 100mg daily    Medication ASE: Denies   Medication adherence: Denies issues     Psychiatric Review of Symptoms Per [facility], Geovanna Blackwell, [facility], during today's  "team-based visit:  Depression: No symptoms  Mel:  No Symptoms  Psychosis: No Symptoms  Anxiety: Excessive worry, Nervousness, and Ruminations  Panic:  No symptoms  Post Traumatic Stress Disorder:  No Symptoms   Eating Disorder: No Symptoms  ADD / ADHD:  No symptoms  Conduct Disorder: No symptoms  Autism Spectrum Disorder: No symptoms  Obsessive Compulsive Disorder: Checking, Obsessions, and ruminations (focus of those varies, rituals if not done correctly have to be re-done, \"slows me down\",safety concerns for self and others (no times when she felt unsafe for self/others)    Pertinent Negatives: No suicidal or violent ideation, psychosis, or hypo/mel.      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   LIVING SITUATION / RELATIONSHIPS- Currently at home for summer.   SOCIAL/ SPIRITUAL SUPPORT- Good friend, gets along with brother well    Pertinent Substance Use  Alcohol- None  Nicotine- None  Caffeine- 1 cup of coffee per day  Opioids- None  Narcan Kit- No  THC/CBD- None  Other Illicit Drugs- none    Substance Use History  Past Use- None  Treatment [#, most recent]- None  Medical Consequences [withdrawal, sz etc]- None  Legal Consequences- None              Medical Review of Systems   Dizziness/orthostasis- Denies   Headaches- Endorses hx of chronic headaches; rare use of naproxen or Sumatriptan   Respiratory- Denies  Cardiovascular- Denies chest pain, tightness, palpitations   Gastrointestinal- Denies  Tics, tremors, restlessness- Denies  Sexual health concerns- None  A comprehensive review of systems was performed and is negative other than noted above.              Past Psychiatric History   Have you been previously diagnosed with any of these mental health condition(s)?: obsessive compulsive disorder What mental health services have you received in the past?: therapy Currently, are you receiving any of the following mental health services?: " none    Self injurious behavior [method, most recent]- None  Suicide attempt [#, most recent, method]- None  Suicidal ideation [passive, active]- None   History of violence/aggression/HI- None   What in the following list protects you from causing harm to yourself or others?: forward or future oriented thinking;dedication to family or friends;safe and stable environment;regular sleep;effectively controls impulses;regular physical activity;abstinence from substances;adherence with prescribed medication;effective problem solving skills;commitment to well being;positive social skills;healthy fear of risky behaviors or pain;sense of personal control or determination, Are there firearms in your home?: are not    Psychosis hx- Denies  Psych hosp [ #, most recent, committed]- Denies  ECT/TMS [#, most recent]- Denies    Eating disorder hx- Denies   Trauma hx-   -Close friend  via suicide (May 2021)  Outpatient programs [Day treatment, DBT, eating disorder tx, etc]- Individual therapy - some exposure therapy.               Past Psychotropic Medications    None    Are you taking/ not taking prescription medications as they were prescribed?: taking              Social History                [per patient report]  Financial- What are your current financial sources?: employment;parents, Does your finances cause stress?: does not  Employment- What is your employment status?: employed fulltime, Able to function?: yes, If you work in a paying job or as a volunteer, describe the job and how long you have held it: : Intern at [employer] for about 3 weeks so far, will continue to . Did you serve in the ?: did not  Living situation- What is your housing situation?: staying in own home/apartment  Feels safe at home- Yes  Household / family- Name: Windy Kingston, Age: 59, Relationship: Mother, Living in same house?: yes, Name: Meet Kingston, Age: 59, Relationship: Father, Living in same house?: yes, Name: Wilfrid  Vashti, Age: 22, Relationship: Brother, Living in same house?: yes  Relationships- What is your current relationship status? : single, What is your sexual orientation?: asexual  Children- Do you have children?: no  Social/spiritual support- Who are the most supportive people in your life?  : siblings;friends  Cultural- What is your cultural background? : , What are ethnic, cultural, or Sikhism influences that may be useful to know about you (for example history of experiencing discrimination, growing up rural/urban, valuing culturally specific treatments)?  : None, What is your preferred language?  : English  Education- What is your highest education? : some college  Early history- Where did you grow up?: [facility], Who took care of you as a child?: biological parents  Raised by- How would you describe your parent's relationships?: were always together  Siblings- Do you have siblings?: yes, How many full siblings do you have?: 1  Quality of family relationships- How would you describe your current family relationships?: fair  Legal- Have you been involved with the legal system (child custody, order for protection, DWI, etc.)?: have not, Do you have a ?  : does not              Family History     Denies family hx of MH/DAV              Past Medical History     Medication allergies:    Allergies   Allergen Reactions    Penicillins Rash     Neurologic Hx [head injury, seizures, etc.]: Denies seizure hx. Minor concussion around age 9 y/o.   Patient Active Problem List   Diagnosis    Amblyopia of right eye    Episodic tension-type headache, not intractable    Pain of left forearm    OCD (obsessive compulsive disorder)     Past Medical History:   Diagnosis Date    Amblyopia of right eye 01/24/2013    OCD (obsessive compulsive disorder)      Contraception- None  Sexually active- Denies              Medications   Current Outpatient Medications   Medication Sig Dispense Refill     Cholecalciferol (VITAMIN D PO) Take  by mouth daily.      naproxen (NAPROSYN) 500 MG tablet TAKE 1 TABLET(500 MG) BY MOUTH TWICE DAILY WITH MEALS 60 tablet 0    Omega-3 Fatty Acids (FISH OIL PO) Take  by mouth.      Pediatric Multivitamins-Fl (MULTI VIT/FL PO) Take  by mouth.      sertraline (ZOLOFT) 100 MG tablet Take 1 tablet (100 mg) by mouth daily 90 tablet 1    SUMAtriptan (IMITREX) 50 MG tablet Take 1 tablet (50 mg) by mouth at onset of headache for migraine May repeat in 2 hours. Max 4 tablets/24 hours. 30 tablet 0                 Physical Exam  (Vitals Only)  There were no vitals taken for this visit.    Pulse Readings from Last 5 Encounters:   03/13/24 78   12/28/23 81   07/22/22 77   06/27/22 96   03/21/22 91     Wt Readings from Last 5 Encounters:   03/13/24 53.5 kg (118 lb) (31%, Z= -0.50)*   12/28/23 54.9 kg (121 lb) (38%, Z= -0.31)*   11/18/22 54 kg (119 lb) (39%, Z= -0.28)*   07/22/22 53.1 kg (117 lb) (36%, Z= -0.36)*   06/27/22 52.3 kg (115 lb 4 oz) (33%, Z= -0.45)*     * Growth percentiles are based on [facility] (Girls, 2-20 Years) data.     BP Readings from Last 5 Encounters:   03/13/24 100/56   12/28/23 115/79   11/18/22 106/72   07/22/22 107/65 (37%, Z = -0.33 /  48%, Z = -0.05)*   06/27/22 117/80 (75%, Z = 0.67 /  94%, Z = 1.55)*     *BP percentiles are based on the 2017 AAP Clinical Practice Guideline for girls                 Mental Status Exam  Alertness: alert  and oriented  Appearance: adequately groomed  Behavior/Demeanor: cooperative, pleasant, and calm, with good eye contact   Speech: normal and regular rate and rhythm  Language: intact and no problems  Psychomotor: normal or unremarkable  Mood: description consistent with euthymia  Affect: full range and appropriate; was congruent to mood  Thought Process/Associations: unremarkable  Thought Content:  Reports none;  Denies suicidal ideation, violent ideation, and delusions  Perception:  Reports none;  Denies auditory hallucinations and visual  hallucinations  Insight: intact  Judgment: intact  Cognition: does  appear grossly intact; formal cognitive testing was not done  Gait and Station:  N/A telehealth              Data       2024     8:31 AM   PROMIS-10 Total Score w/o Sub Scores   PROMIS TOTAL - SUBSCORES 29    29         2024     8:31 AM   CAGE-AID Total Score   Total Score 0    0   Total Score MyChart 0 (A total score of 2 or greater is considered clinically significant)         1/10/2024     4:43 PM 3/13/2024     3:12 PM 2024     8:29 AM   PHQ   PHQ-9 Total Score 4 5 7    7   Q9: Thoughts of better off dead/self-harm past 2 weeks Not at all Not at all Not at all         1/10/2024     4:43 PM 3/13/2024     3:12 PM 2024     8:30 AM   GERONIMO-7 SCORE   Total Score   7 (mild anxiety)   Total Score 5 6 7    7         Liver/kidney function Metabolic Blood counts   Recent Labs   Lab Test 24  1530 23  1637   CR  --  0.70   AST 20 51*   ALT 18 65*   ALKPHOS  --  65    Recent Labs   Lab Test 23  1637   TSH 2.98    Recent Labs   Lab Test 23  1637   WBC 6.8   HGB 13.8   HCT 40.0   MCV 85           No results found for this or any previous visit.    Administrative Billin minutes spent on the date of the encounter doing chart review and reviewing referral documents, history and exam of patient, documentation and further activities as noted above.    Video/Phone Start Time: 925   Video/Phone End Time: 959

## 2024-06-11 NOTE — NURSING NOTE
Is the patient currently in the state of [place]? YES    Visit mode:VIDEO    If the visit is dropped, the patient can be reconnected by: VIDEO VISIT: Text to cell phone:   Telephone Information:   Mobile [phone]       Will anyone else be joining the visit? NO  (If patient encounters technical issues they should call [phone]    How would you like to obtain your AVS? MyChart    Are changes needed to the allergy or medication list? Pt stated no changes to allergies and Pt stated no med changes    Are refills needed on medications prescribed by this physician?     Reason for visit: Consult    Cheyenne Rome F      Care team has reviewed attendance agreement with patient. Patient advised that two failed appointments within 6 months may lead to termination of current episode of care.

## 2024-06-11 NOTE — PROGRESS NOTES
Virtual Visit Details    Type of service:  Video Visit     Originating Location (pt. Location): Home    Distant Location (provider location):  Off-site  Platform used for Video Visit: Izabella

## 2024-06-11 NOTE — PATIENT INSTRUCTIONS
**For crisis resources, please see the information at the end of this document**     Thank you for coming to the [facility] [place] [facility].    Medication Plan  - Increase sertraline to 150mg daily to target OCD symptoms    LABS/PROCEDURES:   Please call your [facility] clinic and ask for a lab only appointment at your earliest convenience.  If your results are reassuring or normal they will be mailed to you or sent through [facility] within 7 days. If the lab tests need quick action we will call you with the results.    FOLLOW UP: Schedule an appointment with me in 4-6 weeks or sooner as needed.  The intake team should be calling you to schedule.  If you dont hear from them, or they were unable to reach you, please call [phone] to schedule.  Follow up with primary care provider as planned or for acute medical concerns.  Call the psychiatric nurse line with medication questions or concerns at [phone].    Medication Refills:  If you need any refills please call your pharmacy and they will contact us. Our fax number for refills is [phone]. Please allow three business for refill processing. If you need to  your refill at a new pharmacy, please contact the new pharmacy directly. The new pharmacy will help you get your medications transferred.     [facility] Assistance [phone]  Financial Assistance [phone]  [facility] Billing [phone]  Central Billing Office, [facility]: [phone]  [facility] Billing [phone]  Medical Records [phone]  [facility] Patient Bill of Rights [email or web address]       MENTAL HEALTH CRISIS RESOURCES:  For a emergency help, please call 911 or go to the nearest Emergency Department.     Emergency Walk-In Options:   EmP[facility]AMES Unit @ [facility] ([place]): [phone] - Specialized mental health emergency area designed to be calming  [facility]  ([place]): [phone]  [facility] ([place]): [phone]  [facility] ([place]): [phone]    National Crisis Information: Call 988 Suicide and Crisis Lifeline  Crisis Text Line (free 24/7):  call **CRISIS (**798166) Crisis or use the texting option by texting 710176.   National Suicide Prevention Lifeline: Call 988  Poison Control Center: 1-502-814-9245  Trans Lifeline: 1-014-234-1637 - Hotline for transgender people of all ages  The Marty Project: 6-371-902-6927 - Hotline for LGBT youth   List of all [facility] resources:   https://[place].gov/dhs/people-we-serve/adults/health-care/mental-health/resources/crisis-contacts.jsp    For Non-Emergency Support:   Fast Tracker: Mental Health & Substance Use Disorder Resources -   https://www.fasttrackermn.org/       Again thank you for choosing [facility] [place] [facility] and please let us know how we can best partner with you to improve you and your family's health.    You may be receiving a survey regarding this appointment. We would love to have your feedback, both positive and negative. The survey is done by an external company, so your answers are anonymous.

## 2024-07-29 NOTE — PROGRESS NOTES
[facility]  2024      Behavioral Health Clinician Progress Note    Patient Name: Chio Kingston           Service Type:  Individual      Service Location:   [place] / Email (patient reached)     Session Start Time: 330pm  Session End Time: 340      Session Length: 10 min     Attendees: Patient     Service Modality:  Video Visit:      Provider verified identity through the following two step process.  Patient provided:  Patient  and Patient address    Telemedicine Visit: The patient's condition can be safely assessed and treated via synchronous audio and visual telemedicine encounter.      Reason for Telemedicine Visit: Patient convenience (e.g. access to timely appointments / distance to available provider)    Originating Site (Patient Location): Patient's home    Distant Site (Provider Location): Provider Remote Setting- Home Office    Consent:  The patient/guardian has verbally consented to: the potential risks and benefits of telemedicine (video visit) versus in person care; bill my insurance or make self-payment for services provided; and responsibility for payment of non-covered services.     Patient would like the video invitation sent by:  My Chart    Mode of Communication:  Video Conference via [facility]    Distant Location (Provider):  Off-site    As the provider I attest to compliance with applicable laws and regulations related to telemedicine.    Visit Activities (Refresh list every visit): [facility] [place] Only    Diagnostic Assessment Date: 2024  Treatment Plan Review Date: 10/30/2024  See Flowsheets for today's PHQ-9 and GERONIMO-7 results  Previous PHQ-9:       1/10/2024     4:43 PM 3/13/2024     3:12 PM 2024     8:29 AM   PHQ-9 SCORE   PHQ-9 Total Score MyChart   7 (Mild depression)   PHQ-9 Total Score 4 5 7    7     Previous GERONIMO-7:       1/10/2024     4:43 PM 3/13/2024     3:12 PM 2024     8:30 AM   GERONIMO-7 SCORE   Total Score   7 (mild  anxiety)   Total Score 5 6 7    7       SPEEDY LEVEL:       No data to display                DATA  Extended Session (60+ minutes): No  Interactive Complexity: No  Crisis: No  [facility] Patient: No    Treatment Objective(s) Addressed in This Session:  Target Behavior(s):  fewer incidents of checking    Functional Impairment: will effectively address problems that interfere with adaptive functioning    Current Stressors / Issues:  MH update: Pt reports that she hasn't noticed many changes in her OCD but her obsessin shave been improving. C explored functional impairments. She reports that she checks things excessively. She has had routines in the past but none currently. [facility] explored patients interest in therapy. She plans on going to the counseling center at her college ([employer]). [facility] positively reinforced. [facility] explored feelings about returning to college. Excited. She is in the orchestra and the physics club. [facility] positively reinforced importance of social connection.   Stresses: returning to college  Appetite: unremarkable  Sleep: unremarkable  Therapy: Pt plans on pursuing it when she returns to [facility] at her campus counseling center  DAV: No  Preg: No  Interventions: goal development  Most important: medication update       Progress on Treatment Objective(s) / Homework:  No improvement - ACTION (Actively working towards change); Intervened by reinforcing change plan / affirming steps taken    Motivational Interviewing    MI Intervention: Co-Developed Goal: fewer incidents of checking, Expressed Empathy/Understanding, Open-ended questions, Reflections: simple and complex, Change talk (evoked), and Reframe     Change Talk Expressed by the Patient: Desire to change Ability to change Reasons to change Need to change Committment to change Activation Taking steps    Provider Response to Change Talk: E - Evoked more info from patient about behavior change, A - Affirmed patient's thoughts, decisions, or  attempts at behavior change, R - Reflected patient's change talk, and S - Summarized patient's change talk statements    Also provided psychoeducation about behavioral health condition, symptoms, and treatment options    Assessments completed prior to visit:  The following assessments were completed by patient for this visit:  PROMIS 10-Global Health (all questions and answers displayed):       6/11/2024     8:31 AM 7/30/2024     3:17 PM   PROMIS 10   In general, would you say your health is: Good    In general, would you say your quality of life is: Very good    In general, how would you rate your physical health? Very good    In general, how would you rate your mental health, including your mood and your ability to think? Good    In general, how would you rate your satisfaction with your social activities and relationships? Good    In general, please rate how well you carry out your usual social activities and roles Very good    To what extent are you able to carry out your everyday physical activities such as walking, climbing stairs, carrying groceries, or moving a chair? Completely    In the past 7 days, how often have you been bothered by emotional problems such as feeling anxious, depressed, or irritable? Sometimes    In the past 7 days, how would you rate your fatigue on average? Moderate    In the past 7 days, how would you rate your pain on average, where 0 means no pain, and 10 means worst imaginable pain? 2    In general, would you say your health is: 3 4   In general, would you say your quality of life is: 4 4   In general, how would you rate your physical health? 4 4   In general, how would you rate your mental health, including your mood and your ability to think? 3 3   In general, how would you rate your satisfaction with your social activities and relationships? 3 3   In general, please rate how well you carry out your usual social activities and roles. (This includes activities at home, at work and  in your community, and responsibilities as a parent, child, spouse, employee, friend, etc.) 4 3   To what extent are you able to carry out your everyday physical activities such as walking, climbing stairs, carrying groceries, or moving a chair? 5 5   In the past 7 days, how often have you been bothered by emotional problems such as feeling anxious, depressed, or irritable? 3 4   In the past 7 days, how would you rate your fatigue on average? 3 3   In the past 7 days, how would you rate your pain on average, where 0 means no pain, and 10 means worst imaginable pain? 2 4   Global Mental Health Score 13    13 12   Global Physical Health Score 16    16 15   PROMIS TOTAL - SUBSCORES 29    29 27     PROMIS 10-Global Health (only subscores and total score):       6/11/2024     8:31 AM 7/30/2024     3:17 PM   PROMIS-10 Scores Only   Global Mental Health Score 13    13 12   Global Physical Health Score 16    16 15   PROMIS TOTAL - SUBSCORES 29    29 27       Care Plan review completed: Yes    Medication Review:  No changes to current psychiatric medication(s)    Medication Compliance:  Yes    Changes in Health Issues:   None reported    Chemical Use Review:   Substance Use: Chemical use reviewed, no active concerns identified      Tobacco Use: No current tobacco use.      Assessment: Current Emotional / Mental Status (status of significant symptoms):  Risk status (Self / Other harm or suicidal ideation)  Patient denies a history of suicidal ideation, suicide attempts, self-injurious behavior, homicidal ideation, homicidal behavior, and and other safety concerns  Patient denies current fears or concerns for personal safety.  Patient denies current or recent suicidal ideation or behaviors.  Patient denies current or recent homicidal ideation or behaviors.  Patient denies current or recent self injurious behavior or ideation.  Patient denies other safety concerns.  A safety and risk management plan has not been developed at  this time, however patient was encouraged to call [place] [phone] should there be a change in any of these risk factors.    Appearance:   Appropriate   Eye Contact:   Good   Psychomotor Behavior: Normal   Attitude:   Cooperative   Orientation:   All  Speech   Rate / Production: Normal    Volume:  Normal   Mood:    Anxious   Affect:    Appropriate   Thought Content:  Clear   Thought Form:  Coherent  Logical   Insight:    Good     Diagnoses:  1. Obsessive-compulsive disorder, unspecified type        Collateral Reports Completed:  Collaborated with [facility] team     Plan: (Homework, other):  Patient was given information about behavioral services and encouraged to schedule a follow up appointment with the clinic [facility] in 1 month.  She was also given information about mental health symptoms and treatment options .  CD Recommendations: No indications of CD issues.       Geovanna Blackwell, [facility]    ______________________________________________________________________    Integrated Primary Care Behavioral Health Treatment Plan    Individual Treatment Plan    Patient's Name: Chio Kingston   YOB: 2005  Date of Creation: 07/30/2024  Date Treatment Plan Last Reviewed/Revised: pending    DSM5 Diagnoses: 300.3 (F42) Unspecified Obsessive Compulsive and Related Disorder  Psychosocial / Contextual Factors: none noted  PROMIS (reviewed every 90 days):   The following assessments were completed by patient for this visit:  PROMIS 10-Global Health (all questions and answers displayed):       6/11/2024     8:31 AM 7/30/2024     3:17 PM   PROMIS 10   In general, would you say your health is: Good    In general, would you say your quality of life is: Very good    In general, how would you rate your physical health? Very good    In general, how would you rate your mental health, including your mood and your ability to think? Good    In general, how would you rate your satisfaction with your social activities and  relationships? Good    In general, please rate how well you carry out your usual social activities and roles Very good    To what extent are you able to carry out your everyday physical activities such as walking, climbing stairs, carrying groceries, or moving a chair? Completely    In the past 7 days, how often have you been bothered by emotional problems such as feeling anxious, depressed, or irritable? Sometimes    In the past 7 days, how would you rate your fatigue on average? Moderate    In the past 7 days, how would you rate your pain on average, where 0 means no pain, and 10 means worst imaginable pain? 2    In general, would you say your health is: 3 4   In general, would you say your quality of life is: 4 4   In general, how would you rate your physical health? 4 4   In general, how would you rate your mental health, including your mood and your ability to think? 3 3   In general, how would you rate your satisfaction with your social activities and relationships? 3 3   In general, please rate how well you carry out your usual social activities and roles. (This includes activities at home, at work and in your community, and responsibilities as a parent, child, spouse, employee, friend, etc.) 4 3   To what extent are you able to carry out your everyday physical activities such as walking, climbing stairs, carrying groceries, or moving a chair? 5 5   In the past 7 days, how often have you been bothered by emotional problems such as feeling anxious, depressed, or irritable? 3 4   In the past 7 days, how would you rate your fatigue on average? 3 3   In the past 7 days, how would you rate your pain on average, where 0 means no pain, and 10 means worst imaginable pain? 2 4   Global Mental Health Score 13    13 12   Global Physical Health Score 16    16 15   PROMIS TOTAL - SUBSCORES 29    29 27     PROMIS 10-Global Health (only subscores and total score):       6/11/2024     8:31 AM 7/30/2024     3:17 PM  "  PROMIS-10 Scores Only   Global Mental Health Score 13    13 12   Global Physical Health Score 16    16 15   PROMIS TOTAL - SUBSCORES 29    29 27        Referral / Collaboration:  Collaborated with [facility] team .    Anticipated number of session for this episode of care: 6-9 sessions  Anticipation frequency of session: Monthly  Anticipated Duration of each session: 16-37 minutes  Treatment plan will be reviewed in 90 days or when goals have been changed.       MeasurableTreatment Goal(s) related to diagnosis / functional impairment(s)  Goal 1: Patient will experience less anxiety   \"I will know I've met my goal when I check less.\"     Objective #A (Patient Action)    Patient will  be able to function without checking things repeatdly  Status: New - Date: 07/30/2024      Intervention(s)  [facility] provided psycho-education for symptom management including the following:     https://www.Proven.com/watch?v=Ui9nyOtShH9  https://www.YelloYelloube.com/watch?v=7-8Y6UbYmeH  https://[facility]/for-[facility]/mindfulness-programs/mindfulness-based-stress-reduction       Slow breathing. When you're anxious, your breathing becomes faster and shallower. Try deliberately slowing down your breathing. Count to three as you breathe in slowly - then count to three as you breathe out slowly.    Progressive muscle relaxation. Find a quiet location. Close your eyes and slowly tense and then relax each of your muscle groups from your toes to your head. Hold the tension for three seconds and then release quickly. This can help reduce the feelings of muscle tension that often comes with anxiety.    Stay in the present moment. Anxiety can make your thoughts live in a terrible future that hasn't happened yet. Try to bring yourself back to where you are. Practising meditation can help.    Healthy lifestyle. Keeping active, eating well, going out into nature, spending time with family and friends, reducing stress and doing the activities you enjoy are all " effective in reducing anxiety and improving your wellbeing.       Take small acts of bravery. Avoiding what makes you anxious provides some relief in the short term, but can make you more anxious in the long term. Try approaching something that makes you anxious - even in a small way. The way through anxiety is by learning that what you fear isn't likely to happen - and if it does, you'll be able to cope with it.    Challenge your self-talk. How you think affects how you feel. Anxiety can make you overestimate the danger in a situation and underestimate your ability to handle it. Try to think of different interpretations to a situation that's making you anxious, rather than jumping to the worst-case scenario. Look at the facts for and against your thought being true.    Plan worry time. It's hard to stop worrying entirely so set aside some time to indulge your worries. Even 10 minutes each evening to write them down or go over them in your head can help stop your worries from taking over at other times.    Get to know your anxiety. Keep a diary of when it's at it's best - and worst. Find the patterns and plan your week - or day - to proactively manage your anxiety.    Learn from others. Talking with others who also experience anxiety - or are going through something similar - can help you feel less alone. Visit our Online Forums to connect with others.    Be kind to yourself. Remember that you are not your anxiety. You are not weak. You are not inferior. You have a mental health condition. It's called anxiety.     Body based skills: Change body temperature by holding ice cube, running hands under cold water, drinking cold water. Eat an altoid mint or other strongly flavored mint candy or sour candy like Warheads or lemon drops. The sour taste will quickly distract from the anxiety and the sweet flavor at the end is rewarding to the brain.     Box Breathing:  Step 1: Breathe in, counting to four slowly. Feel the air  enter your lungs.  Step 2: Hold your breath for 4 seconds. Try to avoid inhaling or exhaling for 4 seconds.  Step 3: Slowly exhale through your mouth for 4 seconds.  Step 4: Repeat steps 1 to 3 until you feel re-centered.    5 Things exercise: find 5 things in the environment that are the same color, 4 that are a same shape, 3 that are the same size, 2 smells, 1 sound      [facility] provided psycho-education on Acceptance and Commitment skills including:  -values clarification  -behavioral activation planning including overcoming barriers  -mindfulness  -cognitive diffusion skill  -facilitate self acceptance    [facility] provided psycho-education on sleep skills including:  -sleep hygiene  -tracking sleep schedule  -creating sleep routine    [facility] provided psycho-education on DBT skills  -mindfulness  -mood regulation techniques    -5 things exercise    -somatic regulation skills (taste of something sweet or sour, notice smells, touch/feel of things in the environment, physical activity such as dancing, walking)    [facility] provided psycho-education of CBT skills including the following:  -challenging destructive thinking  -re-framing  -behavioral activation  -journaling  -mood/behavioral tracking  -exposure response  -mood regulation (deep breathing, somatic skills)    Patient has reviewed and agreed to the above plan.    Written by  Geovanna Blackwell, [facility], [facility]

## 2024-07-30 ENCOUNTER — VIRTUAL VISIT (OUTPATIENT)
Dept: BEHAVIORAL HEALTH | Facility: CLINIC | Age: 19
End: 2024-07-30
Payer: COMMERCIAL

## 2024-07-30 ENCOUNTER — VIRTUAL VISIT (OUTPATIENT)
Dept: PSYCHIATRY | Facility: CLINIC | Age: 19
End: 2024-07-30
Payer: COMMERCIAL

## 2024-07-30 DIAGNOSIS — F42.9 OBSESSIVE-COMPULSIVE DISORDER, UNSPECIFIED TYPE: ICD-10-CM

## 2024-07-30 DIAGNOSIS — Z79.899 ENCOUNTER FOR LONG-TERM (CURRENT) USE OF MEDICATIONS: Primary | ICD-10-CM

## 2024-07-30 DIAGNOSIS — F42.9 OBSESSIVE-COMPULSIVE DISORDER, UNSPECIFIED TYPE: Primary | ICD-10-CM

## 2024-07-30 PROCEDURE — 99207 PR NO CHARGE LOS: CPT | Mod: 95 | Performed by: SOCIAL WORKER

## 2024-07-30 PROCEDURE — G2211 COMPLEX E/M VISIT ADD ON: HCPCS | Mod: 95

## 2024-07-30 PROCEDURE — 99214 OFFICE O/P EST MOD 30 MIN: CPT | Mod: 95

## 2024-07-30 RX ORDER — SERTRALINE HYDROCHLORIDE 100 MG/1
150 TABLET, FILM COATED ORAL DAILY
Qty: 45 TABLET | Refills: 1 | Status: SHIPPED | OUTPATIENT
Start: 2024-07-30 | End: 2024-09-12

## 2024-07-30 ASSESSMENT — PAIN SCALES - GENERAL: PAINLEVEL: NO PAIN (0)

## 2024-07-30 NOTE — PATIENT INSTRUCTIONS
Medication Plan  -Continue sertraline 150mg daily  -Complete EKG through PCP clinic    **For crisis resources, please see the information at the end of this document**   Patient Education    Thank you for coming to the [facility] [place] [facility].     Lab Testing:  If you had lab testing today and your results are reassuring or normal they will be mailed to you or sent through [employer] within 7 days. If the lab tests need quick action we will call you with the results. The phone number we will call with results is # [phone]. If this is not the best number please call our clinic and change the number.     Medication Refills:  If you need any refills please call your pharmacy and they will contact us.  Three business days of notice are needed for general medication refill requests.   Five business days of notice are needed for controlled substance refill requests.   If you need to change to a different pharmacy, please contact the new pharmacy directly. The new pharmacy will help you get your medications transferred.     Contact Us:  Please call [phone] during business hours (8-5:00 M-F).   Financial Assistance [phone]   Medical Records [phone]       MENTAL HEALTH CRISIS RESOURCES:  For a emergency help, please call 911 or go to the nearest Emergency Department.     Emergency Walk-In Options:   EmPATH Unit @ [facility] ([place]): [phone] - Specialized mental health emergency area designed to be calming  [facility] ([place]): [phone]  [facility] ([place]): [phone]  [facility]): [phone]    County Crisis Information:   [place]: [phone]  [place]: [phone]  [place] (MARY) - Adult: [phone]     Child: [phone]  [place] - Adult: [phone]     Child: [phone]  [place]: [phone]  List of all [facility] resources:    https://[place].gov/dhs/people-we-serve/adults/health-care/mental-health/resources/crisis-contacts.jsp    National Crisis Information:   Crisis Text Line: Text  [place]  to 569208  Suicide & Crisis Lifeline: 988  National Suicide Prevention Lifeline: 4-948-649-TALK (1-347.492.5694)       For online chat options, visit https://suicidepreventionlifeline.org/chat/  Poison Control Center: 0-375-229-0109  Trans Lifeline: 2-091-410-1247 - Hotline for transgender people of all ages  The [employer] Project: 8-086-984-7789 - Hotline for LGBT youth     For Non-Emergency Support:   Fast Tracker: Mental Health & Substance Use Disorder Resources -   https://www.[employer].org/

## 2024-07-30 NOTE — NURSING NOTE
Is the patient currently in the state of [place]? YES    Current patient location: [address] [place] [id number]    Visit mode:VIDEO    If the visit is dropped, the patient can be reconnected by: VIDEO VISIT:  Send e-mail to at [email or web address]    Will anyone else be joining the visit? No  (If patient encounters technical issues they should call [phone])    How would you like to obtain your AVS? MyChart    Are changes needed to the allergy or medication list? No    Are refills needed on medications prescribed by this physician? NO    Rooming Documentation: Assigned questionnaire(s) completed .    Reason for visit: RAKAN Tucker

## 2024-07-30 NOTE — Clinical Note
Jeffrey Squires-  I've placed an order for Chio to complete an EKG - I would like to establish a baseline prior to using higher doses of SSRIs for OCD treatment. Is she able to complete this through your office and if so, should she call to schedule a visit with you or nursing-only visit?  Thanks in advance, Marcie

## 2024-07-30 NOTE — PROGRESS NOTES
[facility] [place]  Collaborative Care Psychiatry Service ([facility])  MEDICAL PROGRESS NOTE     Chio Kingston is a 18 year old who prefers the name Chio and pronouns she/her.     Initial consultation on 06/11/24.      CARE TEAM:   PCP- Audelia Squires  Therapist- None              Assessment & Plan     History and interview support the following diagnoses:   Obsessive Compulsive Disorder    Chio is a 18-year-old adult with history of OCD who presents for psychiatric follow-up with [facility]. Medical co-morbidities include chronic headaches.    Patient last seen 06/11/24 at which time sertraline was increased to 150mg daily. Today, Chio notes minimal benefit from Zoloft increase. She has now been at 150mg daily for ~7 weeks. She is tolerating the medication without issue. Continues to endorse significant checking behaviors that interfere with functioning. Next step is to increase sertraline to 200mg daily however I would like her to complete a baseline EKG prior to medication increase knowing that sertraline target dose for OCD can be 200-250mg daily with trials of 8-12 weeks. Will continue current dose of 150mg daily for now; consider increasing to 200mg daily pending completion of EKG. Patient in agreement with plan.     Assessment from initial consultation on 06/11/24:  Overall Chio is doing well. She has been able to interrupt her nighttime routine and has not engaged in this routing for over a year. We completed a YBOCS today with score of 17. She feels that sertraline has been effective for OCD symptom control at 100mg daily however continues to report lingering ruminations and continued engagement in certain compulsions including checking. She has no history of safety concerns and denies SI, NSSI, or HI today. Clinical picture does not involve substance use.    We reviewed use of SSRIs for management of OCD and discussed how  dosing tends to be different (typically higher) to fully target OCD symptoms. Patient in agreement with plan to titrate sertraline to 150mg daily with plan to hold dose increase for at least 8-12 weeks to assess full therapeutic efficacy of increase.     We discussed the risks and benefits of current medication regimen, including precautions, drug interactions and/or potential side effects/adverse reactions. Reviewed adverse side effects of using medications that affect serotonin levels in the body including potential for these medications to contribute to thoughts of death or suicidal thoughts--if that were to happen we would stop the medication right away. We discussed additional side effects including but not limited to signs/symptoms of serotonin syndrome and need to seek urgent medical care should concerns for this arise, risk of time-limited GI upset, and potential for sexual side effects. The patient verbalized understanding of the risks and consented to treatment with the capacity to do so. The patient knows to call the clinic for any problems or access emergency care if needed.    Patient is not engaged in therapy at this time. She may benefit from ERP and will continue to discuss this at follow-up visits.      PSYCHOTROPIC DRUG INTERACTIONS: **Italicized interactions are for treatment plan options not currently implemented**  ADDITIVE SEDATION: sertraline, Imitrex (rare use)  ADDITIVE BLEEDING RISK: sertraline, naproxen (rare use)    MANAGEMENT:  use lowest therapeutic doses of psychotropic medications, routine monitoring, and patient aware of risk    [place]MP was not checked today: not using controlled substances              Plan    1) PSYCHOTROPIC MEDICATION RECOMMENDATIONS:  -Continue sertraline 150mg daily    2) THERAPY: Psychotherapy is a primary recommendation. Declines referral at this time. May benefit from CBT/ERP.    3) NEXT DUE:   Labs- Routine monitoring is not indicated for current psychotropic  medication regimen   ECG- EKG ordered 07/30/24  Rating Scales-   YBOCS:  06/11/24: 17    4) REFERRALS / COORDINATION: None    5) DISPOSITION:   - Pt would benefit from brief psychiatric intervention (up to ~5 visits) to adjust meds and gauge for benefit.   - Follow up with CLAYTON Reyes CNP in 8 weeks. Plan to transition med management back to designated prescriber after brief care is complete.   - If not seen for 6 months, follow up and prescribing will automatically revert back to referring provider / PCP.     Treatment Risk Statement:  The patient understands the risks, benefits, adverse effects and alternatives. Agrees to treatment with the capacity to do so. No medical contraindications to treatment. Agrees to contact provider for any problems. The patient understands to call 911 or go to the nearest ED if urgent or life threatening symptoms occur. Crisis contact numbers are provided routinely in the After Visit Summary.    Suicide Risk Assessment:  Today Chio Kingston reports no suicidal or homicidal ideation. In addition, there are notable risk factors for self-harm, including age and anxiety. However, risk is mitigated by commitment to family, sobriety, absence of past attempts, ability to volunteer a safety plan, history of seeking help when needed, future oriented, no access to firearms or weapons, denies suicidal intent or plan, no family history of suicide, and denies homicidal ideation, intent, or plan. Therefore, based on all available evidence including the factors cited above, Chio Kingston does not appear to be at imminent risk for self-harm, does not meet criteria for a 72-hr hold, and therefore remains appropriate for ongoing outpatient level of care.  A thorough assessment of risk factors related to suicide and self-harm have been reviewed and are noted above. The patient convincingly denies suicidality on several occasions. Local community safety resources printed and reviewed for  "patient to use if needed. There was no deceit detected, and the patient presented in a manner that was believable.     Recommended that patient call 911 or go to the local ED should there be a change in any of these risk factors.    PROVIDER:  CLAYTON Reyes CNP    The [facility] psychiatry providers act as a specialty service for Primary Care Providers in the [facility] who seek to optimize medications for unstable patients. Once medications have been optimized, [facility] providers discharge the patient back to the referring Primary Care Provider for ongoing medication management. Care team has reviewed attendance agreement with patient. Patient advised that two failed appointments within 6 months may lead to termination of current episode of care.     Patient Status:  Patient will continue to be seen for ongoing consultation and stabilization.              Pertinent Background  Diagnosed with OCD September 2021 when psychotherapy was initiated. Symptoms of OCD have been present the majority of her life.   Psych pertinent item history includes trauma hx              History of Present Illness     Per [facility] Geovanna Blackwell during today's team-based visit:  \"Pt reports that she hasn't noticed many changes in her OCD but her obsessin shave been improving. [facility] explored functional impairments. She reports that she checks things excessively. She has had routines in the past but none currently. [facility] explored patients interest in therapy. She plans on going to the counseling center at her college ([employer]). [facility] positively reinforced. [facility] explored feelings about returning to college. Excited. She is in the orchestra and the physics club. [facility] positively reinforced importance of social connection.   Stresses: returning to college  Appetite: unremarkable  Sleep: unremarkable  Therapy: Pt plans on pursuing it when she returns to [employer] [facility] at her campus counseling center  DAV: No  Preg: No  Interventions: " "goal development  Most important: medication update\"    Patient last seen 06/11/24 at which time sertraline was increased to 150mg daily. Since the last visit:  -Denies much change with Zoloft increase. Noticed an increase in fatigue about a month after dose increase. Fatigue lasted a couple weeks and has now improved.   -Denies concerns for sleep.  -Engages in checking of her backpack frequently - checking doesn't take long but it interrupts her daily flow.  -Denies SI/HI.   -Will be restarting therapy when she returns to school.     Current Psychotropic Medication  Zoloft 150mg     Medication ASE: Denies   Medication adherence: Denies issues     Recent Psych Symptoms:   Depression:   Denies  Elevated:   None  Psychosis:  none  Anxiety:  excessive worry, compulsions [checking], and nervous/overwhelmed  Trauma Related:  none  Insomnia:  No  Other:  No    Pertinent Negatives: No suicidal or violent ideation, psychosis, or hypo/radha.      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   LIVING SITUATION / RELATIONSHIPS- Currently at home for summer.   SOCIAL/ SPIRITUAL SUPPORT- Good friend, gets along with brother well    Pertinent Substance Use  Alcohol- None  Nicotine- None  Caffeine- 1 cup of coffee per day  Opioids- None  Narcan Kit- No  THC/CBD- None  Other Illicit Drugs- none    Substance Use History  Past Use- None  Treatment [#, most recent]- None  Medical Consequences [withdrawal, sz etc]- None  Legal Consequences- None              Medical Review of Systems   Dizziness/orthostasis- Denies   Headaches- Endorses hx of chronic headaches; rare use of naproxen or Sumatriptan   Respiratory- Denies  Cardiovascular- Denies chest pain, tightness, palpitations   Gastrointestinal- Denies  Tics, tremors, restlessness- Denies  Sexual health concerns- None  A comprehensive review of systems was performed and is negative other than noted above.              Psych " Summary Points  06/2024: Initial assessment completed 06/11/24, Zoloft increased to 150mg daily  07/2024: Ordered EKG for baseline prior to increasing selective serotonin reuptake inhibitor further              Past Psychotropic Medications  None              Past Medical History     Medication allergies:    Allergies   Allergen Reactions    Penicillins Rash     Neurologic Hx [head injury, seizures, etc.]: Denies seizure hx. Minor concussion around age 9 y/o.   Patient Active Problem List   Diagnosis    Amblyopia of right eye    Episodic tension-type headache, not intractable    Pain of left forearm    OCD (obsessive compulsive disorder)     Past Medical History:   Diagnosis Date    Amblyopia of right eye 01/24/2013    OCD (obsessive compulsive disorder)      Contraception- None  Sexually active- Denies              Medications   Current Outpatient Medications   Medication Sig Dispense Refill    Cholecalciferol (VITAMIN D PO) Take  by mouth daily.      naproxen (NAPROSYN) 500 MG tablet TAKE 1 TABLET(500 MG) BY MOUTH TWICE DAILY WITH MEALS 60 tablet 0    Omega-3 Fatty Acids (FISH OIL PO) Take  by mouth.      Pediatric Multivitamins-Fl (MULTI VIT/FL PO) Take  by mouth.      sertraline (ZOLOFT) 100 MG tablet Take 1.5 tablets (150 mg) by mouth daily 45 tablet 1    SUMAtriptan (IMITREX) 50 MG tablet Take 1 tablet (50 mg) by mouth at onset of headache for migraine May repeat in 2 hours. Max 4 tablets/24 hours. 30 tablet 0                 Physical Exam  (Vitals Only)  There were no vitals taken for this visit.    Pulse Readings from Last 5 Encounters:   03/13/24 78   12/28/23 81   07/22/22 77   06/27/22 96   03/21/22 91     Wt Readings from Last 5 Encounters:   03/13/24 53.5 kg (118 lb) (31%, Z= -0.50)*   12/28/23 54.9 kg (121 lb) (38%, Z= -0.31)*   11/18/22 54 kg (119 lb) (39%, Z= -0.28)*   07/22/22 53.1 kg (117 lb) (36%, Z= -0.36)*   06/27/22 52.3 kg (115 lb 4 oz) (33%, Z= -0.45)*     * Growth percentiles are based on  [facility] (Girls, 2-20 Years) data.     BP Readings from Last 5 Encounters:   03/13/24 100/56   12/28/23 115/79   11/18/22 106/72   07/22/22 107/65 (37%, Z = -0.33 /  48%, Z = -0.05)*   06/27/22 117/80 (75%, Z = 0.67 /  94%, Z = 1.55)*     *BP percentiles are based on the 2017 AAP Clinical Practice Guideline for girls                 Mental Status Exam  Alertness: alert  and oriented  Appearance: adequately groomed  Behavior/Demeanor: cooperative, pleasant, and calm, with good eye contact   Speech: normal and regular rate and rhythm  Language: intact and no problems  Psychomotor: normal or unremarkable  Mood: description consistent with euthymia  Affect: full range and appropriate; was congruent to mood  Thought Process/Associations: unremarkable  Thought Content:  Reports none;  Denies suicidal ideation, violent ideation, and delusions  Perception:  Reports none;  Denies auditory hallucinations and visual hallucinations  Insight: intact  Judgment: intact  Cognition: does  appear grossly intact; formal cognitive testing was not done  Gait and Station:  N/A telehealth              Data       6/11/2024     8:31 AM 7/30/2024     3:17 PM   PROMIS-10 Total Score w/o Sub Scores   PROMIS TOTAL - SUBSCORES 29    29 27         6/11/2024     8:31 AM   CAGE-AID Total Score   Total Score 0    0   Total Score MyChart 0 (A total score of 2 or greater is considered clinically significant)         1/10/2024     4:43 PM 3/13/2024     3:12 PM 6/11/2024     8:29 AM   PHQ   PHQ-9 Total Score 4 5 7    7   Q9: Thoughts of better off dead/self-harm past 2 weeks Not at all Not at all Not at all         1/10/2024     4:43 PM 3/13/2024     3:12 PM 6/11/2024     8:30 AM   GERONIMO-7 SCORE   Total Score   7 (mild anxiety)   Total Score 5 6 7    7         Liver/kidney function Metabolic Blood counts   Recent Labs   Lab Test 03/13/24  1530 12/28/23  1637   CR  --  0.70   AST 20 51*   ALT 18 65*   ALKPHOS  --  65    Recent Labs   Lab Test 12/28/23  1637    TSH 2.98    Recent Labs   Lab Test 12/28/23  1637   WBC 6.8   HGB 13.8   HCT 40.0   MCV 85           No results found for this or any previous visit.    Administrative Billing:     Level of Medical Decision Making:   - At least 1 chronic problem that is not stable  - Engaged in prescription drug management during visit (discussed any medication benefits, side effects, alternatives, etc.)    The longitudinal plan of care for the diagnosis(es)/condition(s) of OCD as documented were addressed during this visit. Due to the added complexity in care, I will continue to support Chio in the subsequent management and with ongoing continuity of care.

## 2024-09-12 DIAGNOSIS — F42.9 OBSESSIVE-COMPULSIVE DISORDER, UNSPECIFIED TYPE: ICD-10-CM

## 2024-09-12 RX ORDER — SERTRALINE HYDROCHLORIDE 100 MG/1
150 TABLET, FILM COATED ORAL DAILY
Qty: 45 TABLET | Refills: 1 | Status: SHIPPED | OUTPATIENT
Start: 2024-09-12

## 2024-09-12 NOTE — TELEPHONE ENCOUNTER
Date of Last Office Visit: 7/30/24  Date of Next Office Visit:  9/24/24  No shows since last visit: No  More than one patient-initiated cancellation (with reschedule) since last seen in clinic? No    []Medication refilled per  Medication Refill in Ambulatory Care  policy.  [x]Medication unable to be refilled by RN due to criteria not met as indicated below:    []Eligibility: has not had a provider visit within last 6 months   []Supervision: no future appointment; < 7 days before next appointment   []Compliance: no shows; cancellations; lapse in therapy   []Verification: order discrepancy; may need modification...   [] > 30-day supply request   []Advanced refill request: > 7 days before refill date   []Controlled medication   [x]Medication not included in policy   []Review: new med; med adjusted ? 30 days; safety alert; requires lab monitoring...   []Scope of Practice: refill request processed by LPN/MA   []Other:      Medication(s) requested:     -  sertraline (ZOLOFT) 100 MG tablet   Date last ordered: 7/30/24  Qty: 45  Refills: 1  Appropriate for refill? Advanced refill request.  Patient is requesting a advanced refill to prevent any missed doses.   Provider to review. Currently too early to refill.  Patient requesting advanced refills.  Provider to consider FUTURE DATING prescription.      Any Controlled Substance(s)? No      Requested medication(s) verified as identical to current order? Yes    Any lapse in adherence to medication(s) greater than 5 days? No      Additional action taken? none.      Last visit treatment plan:   Plan     1) PSYCHOTROPIC MEDICATION RECOMMENDATIONS:  -Continue sertraline 150mg daily     2) THERAPY: Psychotherapy is a primary recommendation. Declines referral at this time. May benefit from CBT/ERP.     3) NEXT DUE:   Labs- Routine monitoring is not indicated for current psychotropic medication regimen   ECG- EKG ordered 07/30/24  Rating Scales-   YBOCS:  06/11/24: 17     4) REFERRALS  / COORDINATION: None     5) DISPOSITION:   - Pt would benefit from brief psychiatric intervention (up to ~5 visits) to adjust meds and gauge for benefit.   - Follow up with CLAYTON Reyes CNP in 8 weeks. Plan to transition med management back to designated prescriber after brief care is complete.   - If not seen for 6 months, follow up and prescribing will automatically revert back to referring provider / PCP.     Any medication(s) require lab monitoring? No    Henrique Ribera RN on 9/12/2024 at 11:02 AM

## 2024-12-03 ENCOUNTER — VIRTUAL VISIT (OUTPATIENT)
Dept: FAMILY MEDICINE | Facility: CLINIC | Age: 19
End: 2024-12-03
Payer: COMMERCIAL

## 2024-12-03 DIAGNOSIS — F42.9 OBSESSIVE-COMPULSIVE DISORDER, UNSPECIFIED TYPE: ICD-10-CM

## 2024-12-03 PROCEDURE — 99213 OFFICE O/P EST LOW 20 MIN: CPT | Mod: 95 | Performed by: FAMILY MEDICINE

## 2024-12-03 RX ORDER — SERTRALINE HYDROCHLORIDE 100 MG/1
150 TABLET, FILM COATED ORAL DAILY
Qty: 135 TABLET | Refills: 1 | Status: SHIPPED | OUTPATIENT
Start: 2024-12-03

## 2024-12-03 ASSESSMENT — PATIENT HEALTH QUESTIONNAIRE - PHQ9
SUM OF ALL RESPONSES TO PHQ QUESTIONS 1-9: 5
SUM OF ALL RESPONSES TO PHQ QUESTIONS 1-9: 5
10. IF YOU CHECKED OFF ANY PROBLEMS, HOW DIFFICULT HAVE THESE PROBLEMS MADE IT FOR YOU TO DO YOUR WORK, TAKE CARE OF THINGS AT HOME, OR GET ALONG WITH OTHER PEOPLE: SOMEWHAT DIFFICULT

## 2024-12-03 ASSESSMENT — ANXIETY QUESTIONNAIRES
4. TROUBLE RELAXING: SEVERAL DAYS
2. NOT BEING ABLE TO STOP OR CONTROL WORRYING: SEVERAL DAYS
GAD7 TOTAL SCORE: 6
IF YOU CHECKED OFF ANY PROBLEMS ON THIS QUESTIONNAIRE, HOW DIFFICULT HAVE THESE PROBLEMS MADE IT FOR YOU TO DO YOUR WORK, TAKE CARE OF THINGS AT HOME, OR GET ALONG WITH OTHER PEOPLE: SOMEWHAT DIFFICULT
1. FEELING NERVOUS, ANXIOUS, OR ON EDGE: MORE THAN HALF THE DAYS
GAD7 TOTAL SCORE: 6
3. WORRYING TOO MUCH ABOUT DIFFERENT THINGS: SEVERAL DAYS
7. FEELING AFRAID AS IF SOMETHING AWFUL MIGHT HAPPEN: SEVERAL DAYS
GAD7 TOTAL SCORE: 6
6. BECOMING EASILY ANNOYED OR IRRITABLE: NOT AT ALL
8. IF YOU CHECKED OFF ANY PROBLEMS, HOW DIFFICULT HAVE THESE MADE IT FOR YOU TO DO YOUR WORK, TAKE CARE OF THINGS AT HOME, OR GET ALONG WITH OTHER PEOPLE?: SOMEWHAT DIFFICULT
5. BEING SO RESTLESS THAT IT IS HARD TO SIT STILL: NOT AT ALL
7. FEELING AFRAID AS IF SOMETHING AWFUL MIGHT HAPPEN: SEVERAL DAYS

## 2024-12-03 NOTE — PROGRESS NOTES
Chio is a 19 year old who is being evaluated via a billable video visit.    How would you like to obtain your AVS? MyChart  If the video visit is dropped, the invitation should be resent by: Text to cell phone: [phone]  Will anyone else be joining your video visit? No      Assessment & Plan     Chio was seen today for recheck medication.    Diagnoses and all orders for this visit:    Obsessive-compulsive disorder, unspecified type, improved        -     Patient reports improved symptoms on current Sertraline dose. Doing well in College.   -     Refill: sertraline (ZOLOFT) 100 MG tablet; Take 1.5 tablets (150 mg) by mouth daily.  -     Plan to complete EKG at the next office visit.         Return in about 3 months (around 3/3/2025) for Physical Exam and Med check visit.      Subjective   Chio is a 19 year old, presenting for the following health issues:  Recheck Medication (OCD)        12/3/2024     4:15 PM   Additional Questions   Roomed by Self My Chart   Accompanied by Self     History of Present Illness       Reason for visit:  OCD medication check up    She eats 2-3 servings of fruits and vegetables daily.She consumes 0 sweetened beverage(s) daily.She exercises with enough effort to increase her heart rate 30 to 60 minutes per day.  She exercises with enough effort to increase her heart rate 3 or less days per week.   She is taking medications regularly.       Medication Followup of Sertraline  Taking Medication as prescribed: yes  Side Effects:  None  Medication Helping Symptoms:  yes- less obsessive and intrusive thoughts.   In her 2nd year of College, away from home and doing well.    Was seen by Psychiatry 7/30/20204-- recommended obtaining an EKG if planning to increase Sertraline dose to 200 mg.     Juanita feels like she is doing well on her current dose of Sertraline 150 mg/day. Requesting a refill today.     Updated PHQ-9/GERONIMO-7 below-           12/3/2024     4:13 PM   Last PHQ-9   1.  Little  interest or pleasure in doing things 1    2.  Feeling down, depressed, or hopeless 1    3.  Trouble falling or staying asleep, or sleeping too much 0    4.  Feeling tired or having little energy 1    5.  Poor appetite or overeating 1    6.  Feeling bad about yourself 1    7.  Trouble concentrating 0    8.  Moving slowly or restless 0    Q9: Thoughts of better off dead/self-harm past 2 weeks 0    PHQ-9 Total Score 5        Patient-reported         12/3/2024     4:13 PM   GERONIMO-7    1. Feeling nervous, anxious, or on edge 2    2. Not being able to stop or control worrying 1    3. Worrying too much about different things 1    4. Trouble relaxing 1    5. Being so restless that it is hard to sit still 0    6. Becoming easily annoyed or irritable 0    7. Feeling afraid, as if something awful might happen 1    GERONIMO-7 Total Score 6    If you checked any problems, how difficult have they made it for you to do your work, take care of things at home, or get along with other people? Somewhat difficult        Patient-reported     In the past two weeks have you had thoughts of suicide or self-harm?  No.    Do you have concerns about your personal safety or the safety of others?   No        Review of Systems  Constitutional, HEENT, cardiovascular, pulmonary, gi and gu systems are negative, except as otherwise noted.      Objective           Vitals:  No vitals were obtained today due to virtual visit.    Physical Exam   GENERAL: alert and no distress  EYES: Eyes grossly normal to inspection.  No discharge or erythema, or obvious scleral/conjunctival abnormalities.  RESP: No audible wheeze, cough, or visible cyanosis.    SKIN: Visible skin clear. No significant rash, abnormal pigmentation or lesions.  NEURO: Cranial nerves grossly intact.  Mentation and speech appropriate for age.  PSYCH: Appropriate affect, tone, and pace of words          Video-Visit Details    Type of service:  Video Visit   Originating Location (pt. Location):  Home  Distant Location (provider location):  On-site  Platform used for Video Visit: Izabella  Signed Electronically by: Audelia Squires MD

## 2025-02-11 ENCOUNTER — PATIENT OUTREACH (OUTPATIENT)
Dept: CARE COORDINATION | Facility: CLINIC | Age: 20
End: 2025-02-11
Payer: COMMERCIAL

## 2025-04-20 ENCOUNTER — HEALTH MAINTENANCE LETTER (OUTPATIENT)
Age: 20
End: 2025-04-20

## 2025-04-26 DIAGNOSIS — F42.9 OBSESSIVE-COMPULSIVE DISORDER, UNSPECIFIED TYPE: ICD-10-CM

## 2025-04-28 NOTE — TELEPHONE ENCOUNTER
Contacts       Contact Date/Time Type Contact Phone/Fax    04/26/2025 08:08 AM CDT Interface (Incoming) [id number] - [place], [place] - [phone] [facility] AT [facility] [address] (Pharmacy) [phone]          Attempted to reach patient to: Schedule an appointment    When patient returns call, please take this action: Assist with scheduling    Reason for the visit:   Please schedule annual exam       When to schedule: Next available    Additional comments/info:     If unable to schedule: Add a note to the telephone encounter noting the barrier and route back to primary care team pool

## 2025-04-30 RX ORDER — SERTRALINE HYDROCHLORIDE 100 MG/1
150 TABLET, FILM COATED ORAL DAILY
Qty: 135 TABLET | Refills: 1 | Status: SHIPPED | OUTPATIENT
Start: 2025-04-30

## 2025-07-23 ENCOUNTER — OFFICE VISIT (OUTPATIENT)
Dept: FAMILY MEDICINE | Facility: CLINIC | Age: 20
End: 2025-07-23
Payer: COMMERCIAL

## 2025-07-23 VITALS
HEIGHT: 64 IN | BODY MASS INDEX: 20.59 KG/M2 | TEMPERATURE: 97.3 F | DIASTOLIC BLOOD PRESSURE: 60 MMHG | OXYGEN SATURATION: 99 % | HEART RATE: 84 BPM | SYSTOLIC BLOOD PRESSURE: 102 MMHG | WEIGHT: 120.6 LBS | RESPIRATION RATE: 16 BRPM

## 2025-07-23 DIAGNOSIS — N94.6 DYSMENORRHEA: ICD-10-CM

## 2025-07-23 DIAGNOSIS — Z13.1 SCREENING FOR DIABETES MELLITUS: ICD-10-CM

## 2025-07-23 DIAGNOSIS — F42.9 OBSESSIVE-COMPULSIVE DISORDER, UNSPECIFIED TYPE: ICD-10-CM

## 2025-07-23 DIAGNOSIS — Z23 NEED FOR VACCINATION: ICD-10-CM

## 2025-07-23 DIAGNOSIS — Z80.8 FAMILY HISTORY OF MELANOMA: ICD-10-CM

## 2025-07-23 DIAGNOSIS — Z12.83 SKIN CANCER SCREENING: ICD-10-CM

## 2025-07-23 DIAGNOSIS — Z00.00 ROUTINE GENERAL MEDICAL EXAMINATION AT A HEALTH CARE FACILITY: Primary | ICD-10-CM

## 2025-07-23 DIAGNOSIS — Z13.220 LIPID SCREENING: ICD-10-CM

## 2025-07-23 DIAGNOSIS — Z79.899 ENCOUNTER FOR LONG-TERM (CURRENT) USE OF MEDICATIONS: ICD-10-CM

## 2025-07-23 LAB
ALBUMIN SERPL BCG-MCNC: 4.6 G/DL (ref 3.5–5.2)
ALP SERPL-CCNC: 59 U/L (ref 40–150)
ALT SERPL W P-5'-P-CCNC: 20 U/L (ref 0–50)
ANION GAP SERPL CALCULATED.3IONS-SCNC: 12 MMOL/L (ref 7–15)
AST SERPL W P-5'-P-CCNC: 28 U/L (ref 0–45)
BILIRUB SERPL-MCNC: 0.3 MG/DL
BUN SERPL-MCNC: 12.5 MG/DL (ref 6–20)
CALCIUM SERPL-MCNC: 9.4 MG/DL (ref 8.8–10.4)
CHLORIDE SERPL-SCNC: 104 MMOL/L (ref 98–107)
CHOLEST SERPL-MCNC: 176 MG/DL
CREAT SERPL-MCNC: 0.68 MG/DL (ref 0.51–0.95)
EGFRCR SERPLBLD CKD-EPI 2021: >90 ML/MIN/1.73M2
ERYTHROCYTE [DISTWIDTH] IN BLOOD BY AUTOMATED COUNT: 11.9 % (ref 10–15)
EST. AVERAGE GLUCOSE BLD GHB EST-MCNC: 97 MG/DL
FASTING STATUS PATIENT QL REPORTED: NO
FASTING STATUS PATIENT QL REPORTED: NO
GLUCOSE SERPL-MCNC: 72 MG/DL (ref 70–99)
HBA1C MFR BLD: 5 % (ref 0–5.6)
HCO3 SERPL-SCNC: 25 MMOL/L (ref 22–29)
HCT VFR BLD AUTO: 38.7 % (ref 35–47)
HDLC SERPL-MCNC: 52 MG/DL
HGB BLD-MCNC: 13.1 G/DL (ref 11.7–15.7)
LDLC SERPL CALC-MCNC: 95 MG/DL
MCH RBC QN AUTO: 29.6 PG (ref 26.5–33)
MCHC RBC AUTO-ENTMCNC: 33.9 G/DL (ref 31.5–36.5)
MCV RBC AUTO: 87 FL (ref 78–100)
NONHDLC SERPL-MCNC: 124 MG/DL
PLATELET # BLD AUTO: 313 10E3/UL (ref 150–450)
POTASSIUM SERPL-SCNC: 4 MMOL/L (ref 3.4–5.3)
PROT SERPL-MCNC: 7.2 G/DL (ref 6.4–8.3)
RBC # BLD AUTO: 4.43 10E6/UL (ref 3.8–5.2)
SODIUM SERPL-SCNC: 141 MMOL/L (ref 135–145)
TRIGL SERPL-MCNC: 147 MG/DL
TSH SERPL DL<=0.005 MIU/L-ACNC: 1.58 UIU/ML (ref 0.3–4.2)
WBC # BLD AUTO: 6.4 10E3/UL (ref 4–11)

## 2025-07-23 PROCEDURE — 36415 COLL VENOUS BLD VENIPUNCTURE: CPT | Performed by: FAMILY MEDICINE

## 2025-07-23 PROCEDURE — 84443 ASSAY THYROID STIM HORMONE: CPT | Performed by: FAMILY MEDICINE

## 2025-07-23 PROCEDURE — 90620 MENB-4C VACCINE IM: CPT | Performed by: FAMILY MEDICINE

## 2025-07-23 PROCEDURE — 83036 HEMOGLOBIN GLYCOSYLATED A1C: CPT | Performed by: FAMILY MEDICINE

## 2025-07-23 PROCEDURE — 80061 LIPID PANEL: CPT | Performed by: FAMILY MEDICINE

## 2025-07-23 PROCEDURE — 3074F SYST BP LT 130 MM HG: CPT | Performed by: FAMILY MEDICINE

## 2025-07-23 PROCEDURE — 3044F HG A1C LEVEL LT 7.0%: CPT | Performed by: FAMILY MEDICINE

## 2025-07-23 PROCEDURE — 80053 COMPREHEN METABOLIC PANEL: CPT | Performed by: FAMILY MEDICINE

## 2025-07-23 PROCEDURE — 3078F DIAST BP <80 MM HG: CPT | Performed by: FAMILY MEDICINE

## 2025-07-23 PROCEDURE — 99213 OFFICE O/P EST LOW 20 MIN: CPT | Mod: 25 | Performed by: FAMILY MEDICINE

## 2025-07-23 PROCEDURE — 99395 PREV VISIT EST AGE 18-39: CPT | Mod: 25 | Performed by: FAMILY MEDICINE

## 2025-07-23 PROCEDURE — 85027 COMPLETE CBC AUTOMATED: CPT | Performed by: FAMILY MEDICINE

## 2025-07-23 PROCEDURE — 90471 IMMUNIZATION ADMIN: CPT | Performed by: FAMILY MEDICINE

## 2025-07-23 RX ORDER — SERTRALINE HYDROCHLORIDE 100 MG/1
150 TABLET, FILM COATED ORAL DAILY
Qty: 135 TABLET | Refills: 1 | Status: SHIPPED | OUTPATIENT
Start: 2025-07-23

## 2025-07-23 SDOH — HEALTH STABILITY: PHYSICAL HEALTH: ON AVERAGE, HOW MANY MINUTES DO YOU ENGAGE IN EXERCISE AT THIS LEVEL?: 50 MIN

## 2025-07-23 SDOH — HEALTH STABILITY: PHYSICAL HEALTH: ON AVERAGE, HOW MANY DAYS PER WEEK DO YOU ENGAGE IN MODERATE TO STRENUOUS EXERCISE (LIKE A BRISK WALK)?: 3 DAYS

## 2025-07-23 ASSESSMENT — ANXIETY QUESTIONNAIRES
GAD7 TOTAL SCORE: 5
7. FEELING AFRAID AS IF SOMETHING AWFUL MIGHT HAPPEN: SEVERAL DAYS
6. BECOMING EASILY ANNOYED OR IRRITABLE: NOT AT ALL
1. FEELING NERVOUS, ANXIOUS, OR ON EDGE: SEVERAL DAYS
2. NOT BEING ABLE TO STOP OR CONTROL WORRYING: SEVERAL DAYS
GAD7 TOTAL SCORE: 5
5. BEING SO RESTLESS THAT IT IS HARD TO SIT STILL: NOT AT ALL
3. WORRYING TOO MUCH ABOUT DIFFERENT THINGS: SEVERAL DAYS
IF YOU CHECKED OFF ANY PROBLEMS ON THIS QUESTIONNAIRE, HOW DIFFICULT HAVE THESE PROBLEMS MADE IT FOR YOU TO DO YOUR WORK, TAKE CARE OF THINGS AT HOME, OR GET ALONG WITH OTHER PEOPLE: SOMEWHAT DIFFICULT

## 2025-07-23 ASSESSMENT — SOCIAL DETERMINANTS OF HEALTH (SDOH): HOW OFTEN DO YOU GET TOGETHER WITH FRIENDS OR RELATIVES?: ONCE A WEEK

## 2025-07-23 ASSESSMENT — PATIENT HEALTH QUESTIONNAIRE - PHQ9
5. POOR APPETITE OR OVEREATING: SEVERAL DAYS
SUM OF ALL RESPONSES TO PHQ QUESTIONS 1-9: 5

## 2025-07-23 NOTE — PATIENT INSTRUCTIONS
Patient Education   Preventive Care Advice   This is general advice given by our system to help you stay healthy. However, your care team may have specific advice just for you. Please talk to your care team about your preventive care needs.  Nutrition  Eat 5 or more servings of fruits and vegetables each day.  Try wheat bread, brown rice and whole grain pasta (instead of white bread, rice, and pasta).  Get enough calcium and vitamin D. Check the label on foods and aim for 100% of the RDA (recommended daily allowance).  Lifestyle  Exercise at least 150 minutes each week  (30 minutes a day, 5 days a week).  Do muscle strengthening activities 2 days a week. These help control your weight and prevent disease.  No smoking.  Wear sunscreen to prevent skin cancer.  Have a dental exam and cleaning every 6 months.  Yearly exams  See your health care team every year to talk about:  Any changes in your health.  Any medicines your care team has prescribed.  Preventive care, family planning, and ways to prevent chronic diseases.  Shots (vaccines)   HPV shots (up to age 26), if you've never had them before.  Hepatitis B shots (up to age 59), if you've never had them before.  COVID-19 shot: Get this shot when it's due.  Flu shot: Get a flu shot every year.  Tetanus shot: Get a tetanus shot every 10 years.  Pneumococcal, hepatitis A, and RSV shots: Ask your care team if you need these based on your risk.  Shingles shot (for age 50 and up)  General health tests  Diabetes screening:  Starting at age 35, Get screened for diabetes at least every 3 years.  If you are younger than age 35, ask your care team if you should be screened for diabetes.  Cholesterol test: At age 39, start having a cholesterol test every 5 years, or more often if advised.  Bone density scan (DEXA): At age 50, ask your care team if you should have this scan for osteoporosis (brittle bones).  Hepatitis C: Get tested at least once in your life.  STIs (sexually  transmitted infections)  Before age 24: Ask your care team if you should be screened for STIs.  After age 24: Get screened for STIs if you're at risk. You are at risk for STIs (including HIV) if:  You are sexually active with more than one person.  You don't use condoms every time.  You or a partner was diagnosed with a sexually transmitted infection.  If you are at risk for HIV, ask about PrEP medicine to prevent HIV.  Get tested for HIV at least once in your life, whether you are at risk for HIV or not.  Cancer screening tests  Cervical cancer screening: If you have a cervix, begin getting regular cervical cancer screening tests starting at age 21.  Breast cancer scan (mammogram): If you've ever had breasts, begin having regular mammograms starting at age 40. This is a scan to check for breast cancer.  Colon cancer screening: It is important to start screening for colon cancer at age 45.  Have a colonoscopy test every 10 years (or more often if you're at risk) Or, ask your provider about stool tests like a FIT test every year or Cologuard test every 3 years.  To learn more about your testing options, visit:   .  For help making a decision, visit:   https://bit.ly/nw45180.  Prostate cancer screening test: If you have a prostate, ask your care team if a prostate cancer screening test (PSA) at age 55 is right for you.  Lung cancer screening: If you are a current or former smoker ages 50 to 80, ask your care team if ongoing lung cancer screenings are right for you.  For informational purposes only. Not to replace the advice of your health care provider. Copyright   2023 [employer]. All rights reserved. Clinically reviewed by the [facility] Transitions Program. [employer] [id number] - REV 01/24.

## 2025-07-23 NOTE — PROGRESS NOTES
Preventive Care Visit  [facility] DAIN Squires MD, Family Medicine  Jul 23, 2025      Assessment & Plan     Chio was seen today for physical.    Diagnoses and all orders for this visit:    Routine general medical examination at a health care facility  -     REVIEW OF HEALTH MAINTENANCE PROTOCOL ORDERS  -     PRIMARY CARE FOLLOW-UP SCHEDULING; Future  -     CBC with platelets  -     Comprehensive metabolic panel (BMP + Alb, Alk Phos, ALT, AST, Total. Bili, TP)  -     TSH with free T4 reflex    Lipid screening  -     Lipid panel reflex to direct LDL Non-fasting    Screening for diabetes mellitus  -     Hemoglobin A1c    Obsessive-compulsive disorder, unspecified type  -     Refill: sertraline (ZOLOFT) 100 MG tablet; Take 1.5 tablets (150 mg) by mouth daily.  -     EKG 12-lead complete with read (future) per Psychiatrist    Encounter for long-term (current) use of medications  -     EKG 12-lead complete with read (future)    Dysmenorrhea        -   Patient education and Handout with home care instructions given. See AVS for details.        -   Treatment options discussed, recommended taking NSAIDs as needed. May also consider OCPs. Patient wishes to hold off OCPs at this time.  Patient education and Handout with home care instructions given. See AVS for details.    Need for vaccination  -     MENINGOCOCCAL B (BEXSERO )    Skin cancer screening- has multiple atypical moles.   -     Adult Dermatology  Referral; Future    Family history of melanoma (mother)  -     Adult Dermatology  Referral; Future        Patient has been advised of split billing requirements and indicates understanding: Yes    Counseling  Appropriate preventive services were addressed with this patient via screening, questionnaire, or discussion as appropriate for fall prevention, nutrition, physical activity, Tobacco-use cessation, social engagement, weight loss and cognition.  Checklist reviewing  preventive services available has been given to the patient.  Reviewed patient's diet, addressing concerns and/or questions.   She is at risk for lack of exercise and has been provided with information to increase physical activity for the benefit of her well-being.   The patient's PHQ-9 score is consistent with mild depression. She was provided with information regarding depression.   Reviewed preventive health counseling, as reflected in patient instructions       Regular exercise       Healthy diet/nutrition      Return in about 6 months (around 1/23/2026) for Virtual Visit., Medication check.      Subjective   Chio is a 19 year old, presenting for the following:  Physical        7/23/2025     2:11 PM   Additional Questions   Roomed by Emelia   Accompanied by self          HPI  Chio is a pleasant 19 year old female patient of mine here for her Annual Physical and chronic disease management.     OCD-  Symptoms fairly well controlled on Sertraline. Tolerating well. No side effects reported.   PHQ-9/GERONIMO 7 completed, see Epic for details.   Following with Therapist and Psychiatrist for medication management.  Due for a baseline EKG, order on file.       Patient wants to discuss painful periods, with spotting, and pain. Not fasting.        Advance Care Planning  Health Care Directive received at today's visit.  Forwarded to [employer].        7/23/2025   General Health   How would you rate your overall physical health? Good   Feel stress (tense, anxious, or unable to sleep) Only a little   (!) STRESS CONCERN      7/23/2025   Nutrition   Three or more servings of calcium each day? Yes   Diet: Regular (no restrictions)   How many servings of fruit and vegetables per day? (!) 2-3   How many sweetened beverages each day? 0-1         7/23/2025   Exercise   Days per week of moderate/strenous exercise 3 days   Average minutes spent exercising at this level 50 min         7/23/2025   Social Factors   Frequency of  gathering with friends or relatives Once a week   Worry food won't last until get money to buy more No   Food not last or not have enough money for food? No   Do you have housing? (Housing is defined as stable permanent housing and does not include staying outside in a car, in a tent, in an abandoned building, in an overnight shelter, or couch-surfing.) Yes   Are you worried about losing your housing? No   Lack of transportation? No   Unable to get utilities (heat,electricity)? No         7/23/2025   Dental   Dentist two times every year? Yes         Today's PHQ-2 Score:       7/23/2025     1:52 PM   PHQ-2 ( 1999 [employer])   Q1: Little interest or pleasure in doing things 1   Q2: Feeling down, depressed or hopeless 1   PHQ-2 Score 2    Q1: Little interest or pleasure in doing things Several days   Q2: Feeling down, depressed or hopeless Several days   PHQ-2 Score 2       Patient-reported           7/23/2025   Substance Use   Alcohol more than 3/day or more than 7/wk Not Applicable   Do you use any other substances recreationally? No     Social History     Tobacco Use    Smoking status: Never     Passive exposure: Never    Smokeless tobacco: Never    Tobacco comments:     Lives in smoke free household   Vaping Use    Vaping status: Never Used   Substance Use Topics    Alcohol use: Never    Drug use: Never           7/23/2025   STI Screening   New sexual partner(s) since last STI/HIV test? No     History of abnormal Pap smear: No - under age 21, PAP not appropriate for age             7/23/2025   Contraception/Family Planning   Questions about contraception or family planning No   What are your periods like? (!) IRREGULAR        Reviewed and updated as needed this visit by Provider     Meds                Past Medical History:   Diagnosis Date    Amblyopia of right eye 01/24/2013    OCD (obsessive compulsive disorder)      Past Surgical History:   Procedure Laterality Date    NO HISTORY OF SURGERY       Lab work is in  process  Labs reviewed in EPIC  BP Readings from Last 3 Encounters:   07/23/25 102/60   03/13/24 100/56   12/28/23 115/79    Wt Readings from Last 3 Encounters:   07/23/25 54.7 kg (120 lb 9.6 oz)   03/13/24 53.5 kg (118 lb) (31%, Z= -0.50)*   12/28/23 54.9 kg (121 lb) (38%, Z= -0.31)*     * Growth percentiles are based on [facility] (Girls, 2-20 Years) data.                  Patient Active Problem List   Diagnosis    Amblyopia of right eye    Episodic tension-type headache, not intractable    Pain of left forearm    OCD (obsessive compulsive disorder)     Past Surgical History:   Procedure Laterality Date    NO HISTORY OF SURGERY         Social History     Tobacco Use    Smoking status: Never     Passive exposure: Never    Smokeless tobacco: Never    Tobacco comments:     Lives in smoke free household   Substance Use Topics    Alcohol use: Never     Family History   Problem Relation Age of Onset    Cancer Mother         Melanoma in her 20s    Other Cancer Mother         Skin cancer (melanoma and basal cell carcinoma)    Anxiety Disorder Father     Cancer Maternal Uncle     Cancer Maternal Grandmother     Diabetes Paternal Uncle     Hypertension No family hx of     Cerebrovascular Disease No family hx of     Thyroid Disease No family hx of     Glaucoma No family hx of     Macular Degeneration No family hx of          Current Outpatient Medications   Medication Sig Dispense Refill    Cholecalciferol (VITAMIN D PO) Take  by mouth daily.      naproxen (NAPROSYN) 500 MG tablet TAKE 1 TABLET(500 MG) BY MOUTH TWICE DAILY WITH MEALS 60 tablet 0    Pediatric Multivitamins-Fl (MULTI VIT/FL PO) Take  by mouth.      sertraline (ZOLOFT) 100 MG tablet Take 1.5 tablets (150 mg) by mouth daily. 135 tablet 1     Allergies   Allergen Reactions    Penicillins Rash         Review of Systems  Constitutional, HEENT, cardiovascular, pulmonary, gi and gu systems are negative, except as otherwise noted.     Objective    Exam  /60   Pulse 84  "  Temp 97.3  F (36.3  C) (Temporal)   Resp 16   Ht 1.635 m (5' 4.37\")   Wt 54.7 kg (120 lb 9.6 oz)   LMP 07/19/2025 (Exact Date)   SpO2 99%   BMI 20.46 kg/m     Estimated body mass index is 20.46 kg/m  as calculated from the following:    Height as of this encounter: 1.635 m (5' 4.37\").    Weight as of this encounter: 54.7 kg (120 lb 9.6 oz).    Physical Exam  GENERAL: alert and no distress  EYES: Eyes grossly normal to inspection, PERRL and conjunctivae and sclerae normal  HENT: ear canals and TM's normal, nose and mouth without ulcers or lesions  NECK: no adenopathy, no asymmetry, masses, or scars  RESP: lungs clear to auscultation - no rales, rhonchi or wheezes  CV: regular rate and rhythm, normal S1 S2, no S3 or S4, no murmur, click or rub, no peripheral edema  ABDOMEN: soft, nontender, no hepatosplenomegaly, no masses and bowel sounds normal  MS: no gross musculoskeletal defects noted, no edema  SKIN: Multiple scattered atypical moles on upper arms and upper back.  NEURO: Normal strength and tone, mentation intact and speech normal  PSYCH: mentation appears normal, affect normal/bright  : Deferred.       Vision Screen  Vision Screen Details  Reason Vision Screen Not Completed: Screening Recommend: Patient/Guardian Declined    Hearing Screen  Hearing Screen Not Completed  Reason Hearing Screen was not completed: Parent declined - No concerns        Signed Electronically by: Audelia Squires MD    "

## 2025-07-24 ENCOUNTER — OFFICE VISIT (OUTPATIENT)
Dept: OPTOMETRY | Facility: CLINIC | Age: 20
End: 2025-07-24
Payer: COMMERCIAL

## 2025-07-24 ENCOUNTER — PATIENT OUTREACH (OUTPATIENT)
Dept: CARE COORDINATION | Facility: CLINIC | Age: 20
End: 2025-07-24

## 2025-07-24 DIAGNOSIS — Z01.00 ROUTINE EYE EXAM: Primary | ICD-10-CM

## 2025-07-24 DIAGNOSIS — H52.222 REGULAR ASTIGMATISM OF LEFT EYE: ICD-10-CM

## 2025-07-24 DIAGNOSIS — H52.01 HYPEROPIA, RIGHT: ICD-10-CM

## 2025-07-24 ASSESSMENT — CONF VISUAL FIELD
OS_NORMAL: 1
OD_SUPERIOR_TEMPORAL_RESTRICTION: 0
OS_INFERIOR_NASAL_RESTRICTION: 0
METHOD: COUNTING FINGERS
OD_SUPERIOR_NASAL_RESTRICTION: 0
OS_SUPERIOR_TEMPORAL_RESTRICTION: 0
OD_INFERIOR_NASAL_RESTRICTION: 0
OS_INFERIOR_TEMPORAL_RESTRICTION: 0
OD_INFERIOR_TEMPORAL_RESTRICTION: 0
OS_SUPERIOR_NASAL_RESTRICTION: 0
OD_NORMAL: 1

## 2025-07-24 ASSESSMENT — REFRACTION_WEARINGRX
OD_AXIS: 010
OS_AXIS: 180
OS_CYLINDER: +0.75
OD_CYLINDER: +0.75
OS_SPHERE: PLANO
OD_SPHERE: +1.50
SPECS_TYPE: SVL

## 2025-07-24 ASSESSMENT — REFRACTION_MANIFEST
OS_AXIS: 177
OS_AXIS: 180
OS_SPHERE: PLANO
OS_CYLINDER: +0.50
OD_SPHERE: +1.25
OD_CYLINDER: SPHERE
OS_SPHERE: +0.25
OD_SPHERE: +1.00
OS_CYLINDER: +0.50

## 2025-07-24 ASSESSMENT — KERATOMETRY
OD_K2POWER_DIOPTERS: 44.00
OS_K1POWER_DIOPTERS: 43.50
OS_K2POWER_DIOPTERS: 43.75
OD_AXISANGLE2_DEGREES: 167
OD_K1POWER_DIOPTERS: 43.00
OS_AXISANGLE2_DEGREES: 13

## 2025-07-24 ASSESSMENT — CUP TO DISC RATIO
OS_RATIO: 0.6
OD_RATIO: 0.7

## 2025-07-24 ASSESSMENT — TONOMETRY
OS_IOP_MMHG: 11
OD_IOP_MMHG: 10
IOP_METHOD: APPLANATION

## 2025-07-24 ASSESSMENT — VISUAL ACUITY
OS_SC+: -1
OD_SC: 20/20
OS_SC: 20/20
METHOD: SNELLEN - LINEAR
OD_SC: 20/20
OD_SC+: -1
OS_SC: 20/20

## 2025-07-24 ASSESSMENT — EXTERNAL EXAM - RIGHT EYE: OD_EXAM: NORMAL

## 2025-07-24 ASSESSMENT — SLIT LAMP EXAM - LIDS
COMMENTS: NORMAL
COMMENTS: NORMAL

## 2025-07-24 ASSESSMENT — EXTERNAL EXAM - LEFT EYE: OS_EXAM: NORMAL

## 2025-07-24 NOTE — PROGRESS NOTES
Chief Complaint   Patient presents with    COMPREHENSIVE EYE EXAM         Last Eye Exam: 10/20/22  Dilated Previously: Yes    What are you currently using to see?  Glasses, Did not bring them to this appointment        Distance Vision Acuity: Satisfied with vision    Near Vision Acuity: Satisfied with vision while reading and using computer with glasses and unaided    Eye Comfort: good  Do you use eye drops? : No  Occupation or Hobbies: Student    [employer]           Medical, surgical and family histories reviewed and updated 7/24/2025.       OBJECTIVE: See Ophthalmology exam    ASSESSMENT:    ICD-10-CM    1. Routine eye exam  Z01.00 EYE EXAM (SIMPLE-NONBILLABLE)     REFRACTION      2. Hyperopia, right  H52.01 EYE EXAM (SIMPLE-NONBILLABLE)     REFRACTION      3. Regular astigmatism of left eye  H52.222 EYE EXAM (SIMPLE-NONBILLABLE)     REFRACTION          PLAN:     Patient Instructions   Fill glasses prescription  Allow 2 weeks to adapt to change in glasses  Return in 1-2 years for eye exam    Aurora Holm O.D.   Electronically Signed    [facility]  [address] [place], [place] [id number] [phone]

## 2025-07-24 NOTE — LETTER
7/24/2025      Chio Kingston  [address] [address] [facility] [address] [facility] [id number]      Dear Colleague,    Thank you for referring your patient, Chio Kingston, to the [facility]. Please see a copy of my visit note below.    Chief Complaint   Patient presents with     COMPREHENSIVE EYE EXAM         Last Eye Exam: 10/20/22  Dilated Previously: Yes    What are you currently using to see?  Glasses, Did not bring them to this appointment        Distance Vision Acuity: Satisfied with vision    Near Vision Acuity: Satisfied with vision while reading and using computer with glasses and unaided    Eye Comfort: good  Do you use eye drops? : No  Occupation or Hobbies: Student    [employer] Optometric Assistant           Medical, surgical and family histories reviewed and updated 7/24/2025.       OBJECTIVE: See Ophthalmology exam    ASSESSMENT:    ICD-10-CM    1. Routine eye exam  Z01.00 EYE EXAM (SIMPLE-NONBILLABLE)     REFRACTION      2. Hyperopia, right  H52.01 EYE EXAM (SIMPLE-NONBILLABLE)     REFRACTION      3. Regular astigmatism of left eye  H52.222 EYE EXAM (SIMPLE-NONBILLABLE)     REFRACTION          PLAN:     Patient Instructions   Fill glasses prescription  Allow 2 weeks to adapt to change in glasses  Return in 1-2 years for eye exam    Aurora Holm O.D.   Electronically Signed    [facility]  [address] [place] [address] [phone]        Again, thank you for allowing me to participate in the care of your patient.        Sincerely,        Aurora Holm OD    Electronically signed

## 2025-07-24 NOTE — PATIENT INSTRUCTIONS
Fill glasses prescription  Allow 2 weeks to adapt to change in glasses  Return in 1-2 years for eye exam    Aurora Holm O.D.   Electronically Signed    [facility]  [address] [place] [address] [phone]

## 2025-07-28 ENCOUNTER — PATIENT OUTREACH (OUTPATIENT)
Dept: CARE COORDINATION | Facility: CLINIC | Age: 20
End: 2025-07-28
Payer: COMMERCIAL